# Patient Record
Sex: FEMALE
[De-identification: names, ages, dates, MRNs, and addresses within clinical notes are randomized per-mention and may not be internally consistent; named-entity substitution may affect disease eponyms.]

---

## 2017-03-17 ENCOUNTER — HOSPITAL ENCOUNTER (EMERGENCY)
Dept: HOSPITAL 14 - H.ER | Age: 42
Discharge: HOME | End: 2017-03-17
Payer: SELF-PAY

## 2017-03-17 VITALS
TEMPERATURE: 98.2 F | DIASTOLIC BLOOD PRESSURE: 78 MMHG | SYSTOLIC BLOOD PRESSURE: 117 MMHG | OXYGEN SATURATION: 98 % | HEART RATE: 76 BPM | RESPIRATION RATE: 20 BRPM

## 2017-03-17 VITALS — BODY MASS INDEX: 24.1 KG/M2

## 2017-03-17 DIAGNOSIS — J06.9: Primary | ICD-10-CM

## 2017-03-17 DIAGNOSIS — J02.9: ICD-10-CM

## 2017-03-17 DIAGNOSIS — E78.00: ICD-10-CM

## 2017-03-17 NOTE — ED PDOC
HPI: CCC, URI, Sore Throat


Time Seen by Provider: 17 13:51


Chief Complaint (Nursing): Flu-like Symptoms


Chief Complaint (Provider): Cough


History Per: Patient


History/Exam Limitations: no limitations


Have you had recent travel within the past 21 days to any of the following 

countries: [place], [place], [place] or [place]?: No


Onset/Duration Of Symptoms: Days (since yesterday)


Current Symptoms Are (Timing): Still Present


Associated Symptoms: Sore Throat, Nasal Congestion.  denies: Fever, Chills


Severity: Moderate


Additional Complaint(s): 


Kasia Salazar is a 41 year old female, with a past medical history that includes 

hypercholesterolemia, who presents to the ED on 17 for the evaluation of 

a moderate, nonproductive cough that she has experienced since yesterday. 

Associated nasal congestion and sore throat also reported, though she denies 

fever, chills, shortness of breath, throat swelling, rash, abdominal pain or 

recent travel. Has medicated with theraflu with a reported worsening of 

symptoms.





Of note, recent sick contacts include the patient's 5 month old granddaughter, 

who was recently diagnosed with Influenza.





PMD: Mary Dimas





Past Medical History


Reviewed: Historical Data, Nursing Documentation, Vital Signs


Vital Signs: 


 Last Vital Signs











Temp  98.2 F   17 13:45


 


Pulse  76   17 13:45


 


Resp  20   17 13:45


 


BP  117/78   17 13:45


 


Pulse Ox  98   17 14:30














- Medical History


PMH: Asthma, Fractures (right foot), Hypercholesterolemia, Malignancy (sarcoma 

rt femur), Migraine


   Denies: HIV, Chronic Kidney Disease





- Surgical History


Surgical History: Appendectomy, 





- Family History


Family History: States: Unknown Family Hx, CAD





- Immunization History


Hx Tetanus Toxoid Vaccination: Yes


Hx Influenza Vaccination: Yes


Hx Pneumococcal Vaccination: No





- Home Medications


Home Medications: 


 Ambulatory Orders











 Medication  Instructions  Recorded


 


Albuterol HFA [Ventolin HFA 90 2 puff IH [id number] 16





mcg/actuation (8 g)]  


 


Fluticasone/Salmeterol 250/50 1 puff IH Q12 16





[Advair Diskus]  


 


Montelukast [Singulair] 10 mg PO HS 16


 


Pantoprazole [Protonix] 40 mg PO DAILY 16


 


Sumatriptan Succinate [Imitrex] 100 mg PO PRN PRN 16


 


Ondansetron ODT [Zofran ODT] 4 mg PO Q8 PRN #10 odt 07/15/16


 


Naproxen [Naprosyn] 500 mg PO Q12H #20 tab 16


 


Cyclobenzaprine [Flexeril] 5 mg PO Q8 PRN #15 tab 17


 


Naproxen [Naprosyn] 1 tab PO BID PRN #30 tab 17


 


Benzonatate [Tessalon Perle] 100 mg PO Q8 PRN #30 capsule 17


 


Fluticasone Propionate [Flonase] 2 spr NS DAILY PRN #1 bottle 17














- Allergies


Allergies/Adverse Reactions: 


 Allergies











Allergy/AdvReac Type Severity Reaction Status Date / Time


 


FISH Allergy Intermediate RASH Verified 17 13:44


 


sulfamethoxazole Allergy  RASH Verified 17 13:44





[From Bactrim]     


 


trimethoprim [From Bactrim] Allergy  RASH Verified 17 13:44


 


vancomycin Allergy  RASH Verified 17 13:44














Review of Systems


ENT: Positive for: Nose Congestion, Throat Pain.  Negative for: Throat Swelling


Respiratory: Positive for: Cough.  Negative for: Shortness of Breath


Gastrointestinal: Negative for: Abdominal Pain


Skin: Negative for: Rash





Physical Exam





- Reviewed


Nursing Documentation Reviewed: Yes


Vital Signs Reviewed: Yes





- Physical Exam


Appears: Positive for: Non-toxic, No Acute Distress


Head Exam: Positive for: ATRAUMATIC, NORMOCEPHALIC


Skin: Positive for: Normal Color, Warm, Dry


Eye Exam: Positive for: Normal appearance, PERRL


ENT: Positive for: Normal ENT Inspection.  Negative for: Pharyngeal Erythema, 

Tonsillar Exudate, Tonsillar Swelling


Cardiovascular/Chest: Positive for: Regular Rate, Rhythm.  Negative for: Murmur


Respiratory: Positive for: Normal Breath Sounds.  Negative for: Respiratory 

Distress


Neurologic/Psych: Positive for: Alert, Oriented





- ECG


O2 Sat by Pulse Oximetry: 98 (RA)


Pulse Ox Interpretation: Normal





- Progress


ED Course And Treament: 


Rapid strep, rapid flu: negative.





Medical Decision Making


Medical Decision Makin:51


Initial Impression: cough/congestion/sore throat; will r/o Influenza, Strep





Initial Plan:


* Influenza A B


* Rapid Strep





--------------------------------------------------------------------------------

----------------- 


Scribe Attestation:


Documented by Kerry Lucas, acting as a scribe for Gurinder Laguna PA-C.





Provider Scribe Attestation:


All medical record entries made by the Scribe were at my direction and 

personally dictated by me. I have reviewed the chart and agree that the record 

accurately reflects my personal performance of the history, physical exam, 

medical decision making, and the department course for this patient. I have 

also personally directed, reviewed, and agree with the discharge instructions 

and disposition.





Disposition





- Clinical Impression


Clinical Impression: 


 Upper respiratory infection





- Patient ED Disposition


Is Patient to be Admitted: No





- Disposition


Referrals: 


[facility] [Outside]


Disposition: Routine/Home


Disposition Time: 15:10


Condition: STABLE


Additional Instructions: 


Follow up with your PMD in 2 days for further evaluation. 


Prescriptions: 


Fluticasone Propionate [Flonase] 2 spr NS DAILY PRN #1 bottle


 PRN Reason: Allergy Symptoms


Benzonatate [Tessalon Perle] 100 mg PO Q8 PRN #30 capsule


 PRN Reason: Cough


Instructions:  Upper Respiratory Infection (ED)


Forms:  [facility] ED School/Work Excuse

## 2017-03-21 ENCOUNTER — HOSPITAL ENCOUNTER (EMERGENCY)
Dept: HOSPITAL 42 - ED | Age: 42
Discharge: HOME | End: 2017-03-21
Payer: COMMERCIAL

## 2017-03-21 VITALS — OXYGEN SATURATION: 96 % | HEART RATE: 85 BPM

## 2017-03-21 VITALS — TEMPERATURE: 98.7 F

## 2017-03-21 VITALS — DIASTOLIC BLOOD PRESSURE: 67 MMHG | SYSTOLIC BLOOD PRESSURE: 113 MMHG

## 2017-03-21 VITALS — RESPIRATION RATE: 16 BRPM

## 2017-03-21 VITALS — BODY MASS INDEX: 23.3 KG/M2

## 2017-03-21 DIAGNOSIS — J40: Primary | ICD-10-CM

## 2017-03-21 DIAGNOSIS — M79.1: ICD-10-CM

## 2017-03-21 LAB
ALBUMIN/GLOB SERPL: 1.1 {RATIO} (ref 1.1–1.8)
ALP SERPL-CCNC: 62 U/L (ref 38–133)
ALT SERPL-CCNC: 76 U/L (ref 7–56)
AST SERPL-CCNC: 57 U/L (ref 15–39)
BILIRUB SERPL-MCNC: 0.7 MG/DL (ref 0.2–1.3)
BUN SERPL-MCNC: 10 MG/DL (ref 7–21)
CALCIUM SERPL-MCNC: 9.1 MG/DL (ref 8.4–10.5)
CHLORIDE SERPL-SCNC: 100 MMOL/L (ref 98–107)
CO2 SERPL-SCNC: 27 MMOL/L (ref 21–33)
ERYTHROCYTE [DISTWIDTH] IN BLOOD BY AUTOMATED COUNT: 12.8 % (ref 11.5–14.5)
GLOBULIN SER-MCNC: 3.9 GM/DL
GLUCOSE SERPL-MCNC: 116 MG/DL (ref 70–110)
HCT VFR BLD CALC: 36.5 % (ref 36–48)
MCH RBC QN AUTO: 29.6 PG (ref 25–35)
MCHC RBC AUTO-ENTMCNC: 34.2 G/DL (ref 31–37)
MCV RBC AUTO: 86.3 FL (ref 80–105)
PLATELET # BLD: 198 10^3/UL (ref 120–450)
PMV BLD AUTO: 10.5 FL (ref 7–11)
POTASSIUM SERPL-SCNC: 3.9 MMOL/L (ref 3.6–5)
PROT SERPL-MCNC: 8 G/DL (ref 5.8–8.3)
SODIUM SERPL-SCNC: 136 MMOL/L (ref 132–148)
TROPONIN I SERPL-MCNC: < 0.01 NG/ML
WBC # BLD AUTO: 8 10^3/UL (ref 4.5–11)

## 2017-03-21 PROCEDURE — 84484 ASSAY OF TROPONIN QUANT: CPT

## 2017-03-21 PROCEDURE — 96361 HYDRATE IV INFUSION ADD-ON: CPT

## 2017-03-21 PROCEDURE — 96374 THER/PROPH/DIAG INJ IV PUSH: CPT

## 2017-03-21 PROCEDURE — 82550 ASSAY OF CK (CPK): CPT

## 2017-03-21 PROCEDURE — 96375 TX/PRO/DX INJ NEW DRUG ADDON: CPT

## 2017-03-21 PROCEDURE — 83615 LACTATE (LD) (LDH) ENZYME: CPT

## 2017-03-21 PROCEDURE — 71010: CPT

## 2017-03-21 PROCEDURE — 99284 EMERGENCY DEPT VISIT MOD MDM: CPT

## 2017-03-21 PROCEDURE — 93005 ELECTROCARDIOGRAM TRACING: CPT

## 2017-03-21 PROCEDURE — 80053 COMPREHEN METABOLIC PANEL: CPT

## 2017-03-21 PROCEDURE — 85027 COMPLETE CBC AUTOMATED: CPT

## 2017-03-21 NOTE — ED PDOC
Arrival/HPI





- General


Chief Complaint: Medical Clearance


Time Seen by Provider: 03/21/17 03:06


Historian: Patient





- History of Present Illness


Narrative History of Present Illness (Text): 


03/21/17 03:36


Christiana Jarrell is a 41 year old female, with a history of asthma, GERD, 

appendectomy, migraine, and hyperlipidemia, presents to the emergency 

department complaining of muscle aches and stiffness. Patient reports of mild 

chest discomfort while coughing and occassional resulted vomiting. States she 

has been taking Motrin for pain relief. Patient is currently treated for flu 

with Tamiflu, but states that symptoms have not improved. Denies fever, chills, 

headache, dizziness, diarrhea, urinary symptoms or any other complaints at this 

time.





Time/Duration: < week


Symptom Onset: Gradual


Symptom Course: Unchanged


Severity Level: Mild


Activities at Onset: Light





Past Medical History





- Provider Review


Nursing Documentation Reviewed: Yes





- Cardiac


Hx Cardiac Disorders: No





- Pulmonary


Hx Asthma: Yes





- Neurological


Hx Migraine: Yes





- HEENT


Hx HEENT Disorder: No





- Renal


Hx Renal Disorder: No





- Endocrine/Metabolic


Hx Endocrine Disorders: No





- Hematological/Oncological


Hx Blood Disorders: No





- Integumentary


Hx Dermatological Disorder: No





- Musculoskeletal/Rheumatological


Hx Fractures: Yes


Other/Comment: fx right femur/plates/screws





- Gastrointestinal


Hx Gastrointestinal Disorders: Yes


Hx Gastroesophageal Reflux: Yes





- Genitourinary/Gynecological


Hx Genitourinary Disorders: No





- Psychiatric


Hx Psychophysiologic Disorder: No


Hx Substance Use: No





- Surgical History


Hx Appendectomy: Yes


Hx Orthopedic Surgery: Yes (/right femur  plates/screws)





- Anesthesia


Hx Anesthesia: Yes


Hx Anesthesia Reactions: Yes (severe vomitting)


Hx Malignant Hyperthermia: No





- Suicidal Assessment


Feels Threatened In Home Enviroment: No





Family/Social History





- Physician Review


Nursing Documentation Reviewed: Yes


Family/Social History: No Known Family HX


Smoking Status: Never Smoked


Hx Alcohol Use: No


Hx Substance Use: No





Allergies/Home Meds


Allergies/Adverse Reactions: 


Allergies





FISH Allergy (Intermediate, Verified 03/21/17 02:50)


 RASH


 Patient reports she will break out if she eats fish 


sulfamethoxazole [From Bactrim] Allergy (Verified 03/21/17 02:50)


 RASH


trimethoprim [From Bactrim] Allergy (Verified 03/21/17 02:50)


 RASH


vancomycin Allergy (Verified 03/21/17 02:50)


 RASH








Home Medications: 


 Home Meds











 Medication  Instructions  Recorded  Confirmed


 


Albuterol HFA [Ventolin HFA 90 2 puff IH [id number] 05/05/16 05/05/16





mcg/actuation (8 g)]   


 


Fluticasone/Salmeterol 250/50 1 puff IH Q12 05/05/16 05/05/16





[Advair Diskus]   


 


Montelukast [Singulair] 10 mg PO HS 05/05/16 05/05/16


 


Pantoprazole [Protonix] 40 mg PO DAILY 05/05/16 05/05/16


 


Sumatriptan Succinate [Imitrex] 100 mg PO PRN PRN 05/05/16 05/05/16














Review of Systems





- Physician Review


All systems were reviewed & negative as marked: Yes





- Review of Systems


Constitutional: Fatigue.  absent: Fevers


Respiratory: Cough.  absent: SOB, Sputum


Cardiovascular: Chest Pain (while coughing )


Musculoskeletal: Myalgias


Neurological: Normal.  absent: Headache, Dizziness


Psychiatric: Normal





Physical Exam


Vital Signs Reviewed: Yes


Vital Signs











  Temp Pulse Resp BP Pulse Ox


 


 03/21/17 07:00     113/67 


 


 03/21/17 06:46   85  16  102/55 L  96


 


 03/21/17 05:50   87  16  108/68  97


 


 03/21/17 04:05   91 H  16  114/71  98


 


 03/21/17 02:53  98.7 F    











Temperature: Afebrile


Blood Pressure: Normal


Pulse: Regular


Respiratory Rate: Normal


Appearance: Positive for: Well-Appearing, Non-Toxic, Comfortable


Pain Distress: None


Mental Status: Positive for: Alert and Oriented X 3





- Systems Exam


Head: Present: Atraumatic, Normocephalic


Pupils: Present: PERRL


Extroacular Muscles: Present: EOMI


Conjunctiva: Present: Normal


Respiratory/Chest: Present: Clear to Auscultation, Good Air Exchange.  No: 

Respiratory Distress, Accessory Muscle Use


Cardiovascular: Present: Regular Rate and Rhythm, Normal S1, S2.  No: Murmurs


Abdomen: Present: Normal Bowel Sounds.  No: Tenderness, Distention, Peritoneal 

Signs, Rebound, Guarding


Neurological: Present: GCS=15, CN II-XII Intact, Speech Normal, Motor Func 

Grossly Intact, Normal Sensory Function


Skin: Present: Warm, Dry, Normal Color.  No: Rashes


Psychiatric: Present: Alert, Oriented x 3, Normal Insight, Normal Concentration





Medical Decision Making


ED Course and Treatment: 


03/21/17 03:45


Impression: A 41 year old female presenting to emergency department for muscle 

aches associated with cough and occassional episode vomiting for past few days. 








Plan:


-- EKG


-- labs, cardiac enzymes


-- Chest X-ray


-- Flexeril


-- IV fluids


-- Toradol


-- Zofran 


-- Reassess and disposition





03/21/17 06:48


Chest X-ray interpreted by me:


no acute disease.





On re-evaluation, patient feels better and is in no acute distress. I have 

discussed the results and plan with the patient, who expresses understanding. 

Patient in agreement with plan to be discharged home. Patient is stable for 

discharge. Patient was instructed to follow up with physician or return if 

symptoms worsen or new concerning symptoms arise.











- Lab Interpretations


Lab Results: 








 03/21/17 04:00 





 03/21/17 04:00 





 Lab Results





03/21/17 04:00: WBC 8.0, RBC 4.23, Hgb 12.5, Hct 36.5, MCV 86.3, MCH 29.6, MCHC 

34.2, RDW 12.8, Plt Count 198, MPV 10.5, Sodium 136, Potassium 3.9, Chloride 100

, Carbon Dioxide 27, Anion Gap 13, BUN 10, Creatinine 0.7, Est GFR (African Amer

) > 60, Est GFR (Non-Af Amer) > 60, Random Glucose 116 H, Calcium 9.1, Total 

Bilirubin 0.7, AST 57 H, ALT 76 H, Alkaline Phosphatase 62, Lactate 

Dehydrogenase 471, Total Creatine Kinase 49, Troponin I < 0.01, Total Protein 

8.0, Albumin 4.1, Globulin 3.9, Albumin/Globulin Ratio 1.1








I have reviewed the lab results: Yes





- RAD Interpretation


Radiology Orders: 








03/21/17 03:32


CHEST PORTABLE [RAD] Stat 











: ED Physician





- Medication Orders


Current Medication Orders: 











Discontinued Medications





Cyclobenzaprine HCl (Flexeril)  10 mg PO ONCE ONE


   Stop: 03/21/17 03:34


   Last Admin: 03/21/17 03:55  Dose: 10 MG





Guaifenesin/Codeine Phosphate (Robitussin W/Codeine)  5 ml PO ONCE ONE


   Stop: 03/21/17 04:33


   Last Admin: 03/21/17 04:45  Dose: 5 ML





Sodium Chloride (Sodium Chloride 0.9%)  1,000 mls @ 999 mls/hr IV .Q1H1M STA


   Stop: 03/21/17 04:34


   Last Admin: 03/21/17 04:03  Dose: 999 MLS/HR





eMAR Start Stop


 Document     03/21/17 04:03  YP  (Rec: 03/21/17 04:03  [id number])


     Intravenous Solution


      Start Date                                 03/21/17


      Start Time                                 04:03


      End Date                                   03/21/17


      End time                                   05:03


      Total Infusion Time                        60





Ketorolac Tromethamine (Toradol)  30 mg IVP ONCE ONE


   Stop: 03/21/17 03:34


   Last Admin: 03/21/17 04:03  Dose: 30 MG





IVP Administration


 Document     03/21/17 04:03  YP  (Rec: 03/21/17 04:04  YP  [id number])


     Charges for Administration


      # of IVP Administrations                   1





Ondansetron HCl (Zofran Inj)  4 mg IVP ONCE ONE


   Stop: 03/21/17 03:35


   Last Admin: 03/21/17 04:01  Dose: 4 MG





IVP Administration


 Document     03/21/17 04:01  YP  (Rec: 03/21/17 04:04  YP  [id number])


     Charges for Administration


      # of IVP Administrations                   1














- Scribe Statement


The provider has reviewed the documentation as recorded by the Marylin Noble 


Provider Attestation: 





All medical record entries made by the Marylin were at my direction and 

personally dictated by me. I have reviewed the chart and agree that the record 

accurately reflects my personal performance of the history, physical exam, 

medical decision making, and the department course for this patient. I have 

also personally directed, reviewed, and agree with the discharge instructions 

and disposition.





Disposition/Present on Arrival





- Present on Arrival


Any Indicators Present on Arrival: No


History of DVT/PE: No


History of Uncontrolled Diabetes: No


Urinary Catheter: No


History of Decub. Ulcer: No


History Surgical Site Infection Following: None





- Disposition


Have Diagnosis and Disposition been Completed?: Yes


Diagnosis: 


 Bronchitis, Myalgia


Disposition: HOME/ ROUTINE


Disposition Time: 06:41


Patient Plan: Discharge


Patient Problems: 


 Current Active Problems











Problem Status Diagnosed


 


Bronchitis Acute 


 


Myalgia Acute 











Condition: GOOD


Discharge Instructions (ExitCare):  Acute Bronchitis (ED), Musculoskeletal Pain 

(ED)


Additional Instructions: 


Take meds as prescribed/drink plenty of liquids/follow up with your doctor this 

week


Prescriptions: 


Cyclobenzaprine [Cyclobenzaprine HCl] 10 mg PO TID PRN #15 tab


 PRN Reason: Muscle Spasm


guaiFENesin/Dextromethorphan [guaiFENesin-DM] 5 ml PO Q6 PRN #6 oz


 PRN Reason: Cough


Albuterol HFA [Ventolin HFA 90 mcg/actuation (8 g)] 2 puff IH [id number] PRN #1 

puff


 PRN Reason: Wheezing


Azithromycin [Zithromax] 250 mg PO DAILY #6 tab


Forms:  WORK NOTE

## 2017-03-21 NOTE — RAD
HISTORY:

cough  



COMPARISON:

No prior. 



FINDINGS:



LUNGS:

Suboptimal study due to rotation. No evidence of focal infiltrate or 

consolidation in the lungs.



PLEURA:

No significant pleural effusion identified, no pneumothorax apparent.



CARDIOVASCULAR:

Normal.



OSSEOUS STRUCTURES:

No significant abnormalities.



VISUALIZED UPPER ABDOMEN:

Normal.



OTHER FINDINGS:

None.



IMPRESSION:

No radiographic evidence of pneumonia.

## 2017-03-21 NOTE — CARD
--------------- APPROVED REPORT --------------





EKG Measurement

Heart [id number]

SD 116P4

[id number]

[id number]

[id number]



<Conclusion>

Normal sinus rhythm

Normal ECG

## 2017-04-30 ENCOUNTER — HOSPITAL ENCOUNTER (EMERGENCY)
Dept: HOSPITAL 31 - C.ER | Age: 42
Discharge: HOME | End: 2017-04-30
Payer: MEDICAID

## 2017-04-30 VITALS
OXYGEN SATURATION: 99 % | HEART RATE: 70 BPM | DIASTOLIC BLOOD PRESSURE: 64 MMHG | TEMPERATURE: 98.3 F | RESPIRATION RATE: 18 BRPM | SYSTOLIC BLOOD PRESSURE: 94 MMHG

## 2017-04-30 VITALS — BODY MASS INDEX: 23.3 KG/M2

## 2017-04-30 DIAGNOSIS — M79.671: Primary | ICD-10-CM

## 2017-04-30 NOTE — C.PDOC
History Of Present Illness


41 year old patient presents to the ED complaining of right foot heel since 

yesterday. Patient states she has a burning and pulling sensation. Patient 

notes she took Motrin prior to arrival. Patient denies any new trauma, fever, 

numbness, weakness, or any more pain medications. Patient has a history of 

plantar fasciitis, surgical removal of benign tumors, and fractures to the left 

foot.


Time Seen by Provider: 17 13:38


Chief Complaint (Nursing): Lower Extremity Problem/Injury


History Per: Patient


History/Exam Limitations: no limitations


Onset/Duration Of Symptoms: Days (1)


Current Symptoms Are (Timing): Still Present


Severity: Mild


Pain Scale Rating Of: 3


Recent travel outside of the [place]: No





Past Medical History


Reviewed: Historical Data, Nursing Documentation, Vital Signs


Vital Signs: 


 Last Vital Signs











Temp  98.3 F   17 13:33


 


Pulse  70   17 13:33


 


Resp  18   17 13:33


 


BP  94/64 L  17 13:33


 


Pulse Ox  99   17 15:24














- Medical History


PMH: Asthma, Fractures, Hypercholesterolemia, Malignancy (sarcoma rt femur), 

Migraine


Surgical History: Appendectomy, 





- [facility] Procedures








APPLICATION OF SPLINT (14)


EXCISION OF ESOPHAGUS, ENDO, DIAGN (11/27/15)


EXCISION OF STOMACH, ENDO, DIAGN (11/27/15)


IMMOBILIZ/WOUND ATTN NEC (03/08/15)


INJECT/INFUSE NEC (01/19/15)


PHYSICAL THERAPY NEC (01/08/15)








Family History: States: CAD





- Social History


Hx Tobacco Use: No


Hx Alcohol Use: No


Hx Substance Use: No





- Immunization History


Hx Tetanus Toxoid Vaccination: Yes


Hx Influenza Vaccination: Yes


Hx Pneumococcal Vaccination: No





Review Of Systems


Except As Marked, All Systems Reviewed And Found Negative.


Constitutional: Negative for: Fever


Musculoskeletal: Positive for: Foot Pain (right heel)


Neurological: Negative for: Weakness, Numbness





Physical Exam





- Physical Exam


Appears: Non-toxic, No Acute Distress


Skin: Warm, Dry


Head: Atraumatic, Normacephalic


Eye(s): bilateral: Normal Inspection, EOMI


Oral Mucosa: Moist


Neck: Normal ROM, Supple


Chest: Symmetrical


Respiratory: No Accessory Muscle Use


Back: Normal Inspection


Extremity: Normal ROM, No Pedal Edema, No Calf Tenderness, Capillary Refill (<2 

seconds), No Deformity, No Swelling, Other (healed scar to the medial aspect of 

the right ankle; tenderness to the right heel; sensation intact; full ROM to 

the right foot)


Pulses: Left Dorsalis Pedis: Normal, Right Dorsalis Pedis: Normal


Neurological/Psych: Oriented x3, Normal Speech, Normal Cognition, Normal Motor, 

Normal Sensation


Gait: Steady





ED Course And Treatment


O2 Sat by Pulse Oximetry: 99 (RA)


Pulse Ox Interpretation: Normal





- Other Rad


  ** right ankle


X-Ray: Read By Radiologist (DR. Tao, Neda PATINO MD)


Interpretation: PROCEDURE:  Right Ankle Radiographs.  HISTORY:  pain.  

COMPARISON:  Right ankle radiographs performed 9/2/15.  FINDINGS:  BONES:  No 

acute displaced fracture. Small heel spur.  JOINTS:  No dislocation.  SOFT 

TISSUES:  Unremarkable. No evidence of radiopaque foreign body.  OTHER FINDINGS

:  None.  IMPRESSION:  No acute displaced fracture, dislocation, or significant 

joint effusion identified.  If symptoms persist or if there is clinical concern

, x-ray follow-up in 7-10 days should be considered.


Progress Note: Plan:  -Right ankle x-ray.  -Reassess and disposition.  On 

reassessment, patient is resting comfortably, and is in no acute distress. 

Patient was instructed to follow up with podiatrist in 1-2 days for further 

evaluation. Air cast applied by ed tech.





Disposition





- Disposition


Referrals: 


[facility] [Outside]


Christina Glass MD [Staff Provider] - 


Disposition: HOME/ ROUTINE


Disposition Time: 14:26


Condition: STABLE


Additional Instructions: 


Follow up with the clinic in 2-5 days for further evaluation. Take medications 

as prescribed. Return to the emergency department at any time if symptoms 

persist or worsen. You may call [facility] for any assistance [phone].


Instructions:  Foot Sprain (ED)





- Clinical Impression


Clinical Impression: 


 Foot pain








- PA / NP / Resident Statement


MD/DO has reviewed & agrees with the documentation as recorded.





- Scribe Statement


The provider has reviewed the documentation as recorded by the Scribe


Priya Nolen





All medical record entries made by the Scribe were at my direction and 

personally dictated by me. I have reviewed the chart and agree that the record 

accurately reflects my personal performance of the history, physical exam, 

medical decision making, and the department course for this patient. I have 

also personally directed, reviewed, and agree with the discharge instructions 

and disposition.

## 2017-04-30 NOTE — RAD
PROCEDURE:  Right Ankle Radiographs.



HISTORY:

pain  



COMPARISON:

Right ankle radiographs performed 9/2/15



FINDINGS:



BONES:

No acute displaced fracture. Small heel spur. 



JOINTS:

No dislocation. 



SOFT TISSUES:

Unremarkable. No evidence of radiopaque foreign body. 



OTHER FINDINGS:

None.



IMPRESSION:

No acute displaced fracture, dislocation, or significant joint 

effusion identified.



If symptoms persist or if there is clinical concern, x-ray follow-up 

in 7-10 days should be considered.

## 2017-07-12 ENCOUNTER — HOSPITAL ENCOUNTER (EMERGENCY)
Dept: HOSPITAL 42 - ED | Age: 42
Discharge: LEFT BEFORE DISCHARGE | End: 2017-07-12
Payer: COMMERCIAL

## 2017-07-12 VITALS
DIASTOLIC BLOOD PRESSURE: 70 MMHG | SYSTOLIC BLOOD PRESSURE: 103 MMHG | OXYGEN SATURATION: 99 % | RESPIRATION RATE: 19 BRPM | TEMPERATURE: 98.1 F | HEART RATE: 74 BPM

## 2017-07-12 VITALS — BODY MASS INDEX: 23.3 KG/M2

## 2017-07-12 DIAGNOSIS — M79.604: Primary | ICD-10-CM

## 2017-07-12 NOTE — ED PDOC
Arrival/HPI





- General


Historian: Patient





- History of Present Illness


Time/Duration: 4-6 hours


Symptom Course: Intermittent


Severity Level: Moderate


Activities at Onset: Other (walking up flight of stairs)





- General


Chief Complaint: Lower Extremity Problem/Injury


Time Seen by Provider: 07/12/17 16:04





- History of Present Illness


Narrative History of Present Illness (Text): 





07/12/17 17:40





Christiana Jarrell is a 42 year old female with pmh sig for plantar fascitis, 

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 

foot, and plantar fascititis complaining of right leg pain. Pt states that 

today she was walking up a flight of stairs when she injured her leg. She does 

not point to any specific reason for injuring her leg. She does report feeling 

both a popping and tearing sensation in her right leg. The patient stated that 

after the inital insult she began to feel a burning sensation down her leg. 

Shortly after her injury she began to feel an increased burning sensation on 

her posterior leg and into her heel and bottom of foot as well as feeling a 

different sensation of pins and needles on her anterior foot. She states she 

has never felt these symptoms prior to today and that they are different from 

her previous issues with neuropathy and pain in her right leg. She has tried 

tyelnol and motrin with no help. She states the pain is worse with bearing 

weight. She describes the pain sensation as intermittent through out the day 

with no particular pattern. 


 (ISABEL CURRAN)





Past Medical History





- Provider Review


Nursing Documentation Reviewed: Yes





- Infectious Disease


Hx of Infectious Diseases: None





- Cardiac


Hx Cardiac Disorders: Yes





- Pulmonary


Hx Asthma: Yes





- Neurological


Hx Migraine: Yes





- HEENT


Hx HEENT Disorder: No





- Renal


Hx Renal Disorder: No





- Endocrine/Metabolic


Hx Endocrine Disorders: No





- Hematological/Oncological


Hx Blood Disorders: No





- Integumentary


Hx Dermatological Disorder: No





- Musculoskeletal/Rheumatological


Hx Fractures: Yes





- Gastrointestinal


Hx Gastrointestinal Disorders: Yes


Hx Gastroesophageal Reflux: Yes





- Genitourinary/Gynecological


Hx Genitourinary Disorders: No





- Psychiatric


Hx Psychophysiologic Disorder: No


Hx Substance Use: No





- Surgical History


Hx Appendectomy: Yes


Hx Musculoskeletal Surgery: Yes (L femur, screw and plate placement)


Other/Comment: ovarian ablasion





- Anesthesia


Hx Anesthesia: Yes


Hx Anesthesia Reactions: Yes (severe vomitting)


Hx Malignant Hyperthermia: No





- Suicidal Assessment


Feels Threatened In Home Enviroment: No





Family/Social History





- Physician Review


Nursing Documentation Reviewed: Yes


Family/Social History: No Known Family HX


Smoking Status: Never Smoked


Hx Alcohol Use: No


Hx Substance Use: No





Allergies/Home Meds


Allergies/Adverse Reactions: 


Allergies





FISH Allergy (Intermediate, Verified 07/12/17 15:56)


 RASH


 Patient reports she will break out if she eats fish 


sulfamethoxazole [From Bactrim] Allergy (Verified 07/12/17 15:56)


 RASH


trimethoprim [From Bactrim] Allergy (Verified 07/12/17 15:56)


 RASH


vancomycin Allergy (Verified 07/12/17 15:56)


 RASH








Home Medications: 


 Home Meds











 Medication  Instructions  Recorded  Confirmed


 


No Known Home Med  04/30/17 07/12/17














Review of Systems





- Review of Systems


Constitutional: absent: Fatigue, Weight Change


Eyes: absent: Vision Changes, Photophobia


ENT: absent: Hearing Changes, Tinnitus


Respiratory: absent: SOB, Cough


Cardiovascular: Normal.  absent: Chest Pain, Palpitations


Gastrointestinal: absent: Abdominal Pain, Constipation, Diarrhea


Genitourinary Female: absent: Dysuria, Frequency


Musculoskeletal: Other (left lower extremity pain associated with left leg 

numbness and burning sensation )


Skin: absent: Rash, Pruritis


Neurological: Other.  absent: Gait Changes





Physical Exam


Vital Signs Reviewed: Yes


Temperature: Afebrile


Blood Pressure: Normal


Pulse: Regular


Respiratory Rate: Normal


Appearance: Positive for: Well-Appearing


Pain Distress: Mild


Mental Status: Positive for: Alert and Oriented X 3





- Systems Exam


Head: Present: Atraumatic, Normocephalic


Pupils: Present: PERRL


Extroacular Muscles: Present: EOMI


Conjunctiva: Present: Normal


Mouth: Present: Moist Mucous Membranes


Neck: Present: Normal Range of Motion


Respiratory/Chest: Present: Clear to Auscultation, Good Air Exchange.  No: 

Respiratory Distress, Accessory Muscle Use


Cardiovascular: Present: Regular Rate and Rhythm, Normal S1, S2


Abdomen: Present: Normal Bowel Sounds.  No: Tenderness, Distention


Upper Extremity: Present: Normal Inspection, NORMAL PULSES.  No: Cyanosis


Lower Extremity: Present: NORMAL PULSES, Tenderness (Left dorsal and plantar 

aspect of foot), Neurovascularly Intact (bilaterally), Capillary Refill < 2 s.  

No: Normal ROM (limited ROM of left ankle 2/2 discomfort), Swelling, Erythema, 

Deformity


Neurological: Present: GCS=15, CN II-XII Intact, Speech Normal


Skin: Present: Warm, Dry


Psychiatric: Present: Alert, Oriented x 3





Medical Decision Making


ED Course and Treatment: 





07/12/17 18:11


Impression:





Christiana Jarrell is a 42 year old female with a past medical history significant 

for chronic pain and nerve issues to her right leg who complaining of new onset 

left leg pain for the past 4-6 hours. 





Differential Diagnosis included but are not limited to:  





- Acute on chronic left leg pain


- Neuropathic pain associated with chronic left leg pain secondary to previous 

surgeries involving removal of benign tumors


- Plantar fascitis 





Plan:





- Discussed with patient need for varying modalities of diagnostic imaging and 

testing to further evaluate nerve pain/discomfort of lower extremities and 

patient was agreeable and understood


-- Reassess and disposition








Progress Notes:


 (ISABEL CURRAN)





- PA / NP / Resident Statement


MD/DO has reviewed & agrees with the documentation as recorded.


MD/DO has examined the patient and agrees with the treatment plan.





Disposition/Present on Arrival





- Present on Arrival


Any Indicators Present on Arrival: No


History of DVT/PE: No


History of Uncontrolled Diabetes: No


Urinary Catheter: No


History of Decub. Ulcer: No


History Surgical Site Infection Following: None





- Disposition


Have Diagnosis and Disposition been Completed?: Yes


Disposition Time: 17:45





- Disposition


Diagnosis: 


 Leg pain





Disposition: ELOPEMENT - ER ONLY


Condition: GOOD


Discharge Instructions (ExitCare):  Leg Pain (ED)


Additional Instructions: 





Thank you for letting us take care of you today. The emergency medical care you 

received today was directed at your acute symptoms. If you were prescribed any 

medication, please fill it and take as directed. It may take several days for 

your symptoms to resolve. Return to the Emergency Department if your symptoms 

worsen, do not improve, or if you have any other problems.





Please contact your doctor or call one of the physicians/clinics you have been 

referred to that are listed on the Patient Visit Information form that is 

included in your discharge packet. Bring any paperwork you were given at 

discharge with you along with any medications you are taking to your follow up 

visit. Our treatment cannot replace ongoing medical care by a primary care 

provider (PCP) outside of the emergency department.





Thank you for allowing the [facility] team to be part of your care today.














Please follow up with your primary doctor in 2-3 days.  You may need an MRI or 

other testing as an outpatient.


Referrals: 


PCP,NO [Primary Care Provider] - Follow up with primary

## 2017-07-22 ENCOUNTER — HOSPITAL ENCOUNTER (EMERGENCY)
Dept: HOSPITAL 14 - H.ER | Age: 42
Discharge: HOME | End: 2017-07-22
Payer: MEDICAID

## 2017-07-22 VITALS
DIASTOLIC BLOOD PRESSURE: 71 MMHG | TEMPERATURE: 98.6 F | OXYGEN SATURATION: 98 % | RESPIRATION RATE: 18 BRPM | HEART RATE: 78 BPM | SYSTOLIC BLOOD PRESSURE: 113 MMHG

## 2017-07-22 VITALS — BODY MASS INDEX: 23.3 KG/M2
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
## 2017-07-22 DIAGNOSIS — S89.91XA: Primary | ICD-10-CM

## 2017-07-22 DIAGNOSIS — S99.911A: ICD-10-CM

## 2017-07-22 DIAGNOSIS — E78.00: ICD-10-CM

## 2017-07-22 NOTE — ED PDOC
Lower Extremity Pain/Injury


Time Seen by Provider: 17 09:47


Chief Complaint (Nursing): Lower Extremity Problem/Injury


Chief Complaint (Provider): Knee injury


History Per: Patient


History/Exam Limitations: no limitations


Onset/Duration Of Symptoms: Days (Yesterday)


Current Symptoms Are (Timing): Still Present


Additional Complaint(s): 





Pt. with R knee and ankle pain.  Started last night after twisting her ankle 

and knee while walking.  No numbness, tingles, weakness.  No hip pain.  No back 

pain, chest pain, dyspnea.  No dizziness, head injury.  Ambulating on it with 

pain and wearing slippers.





Past Medical History


Reviewed: Nursing Documentation, Vital Signs


Vital Signs: 





 Last Vital Signs











Temp  98.6 F   17 10:05


 


Pulse  78   17 10:05


 


Resp  18   17 10:05


 


BP  113/71   17 10:05


 


Pulse Ox  98   17 10:05














- Medical History


PMH: Asthma, Fractures, Hypercholesterolemia, Malignancy (sarcoma rt femur), 

Migraine


   Denies: Chronic Kidney Disease





- Surgical History


Surgical History: Appendectomy, 





- Family History


Family History: States: CAD





- Immunization History


Hx Tetanus Toxoid Vaccination: Yes


Hx Influenza Vaccination: Yes


Hx Pneumococcal Vaccination: No





- Home Medications


Home Medications: 


 Ambulatory Orders











 Medication  Instructions  Recorded


 


Ibuprofen [Motrin] 600 mg PO TID 7 Days 17














- Allergies


Allergies/Adverse Reactions: 


 Allergies











Allergy/AdvReac Type Severity Reaction Status Date / Time


 


FISH Allergy Intermediate RASH Verified 17 10:15


 


sulfamethoxazole Allergy  RASH Verified 17 10:15





[From Bactrim]     


 


trimethoprim [From Bactrim] Allergy  RASH Verified 17 10:15


 


vancomycin Allergy  RASH Verified 17 10:15














Review of Systems


Constitutional: Negative for: Weakness


Cardiovascular: Negative for: Chest Pain


Respiratory: Negative for: Shortness of Breath


Musculoskeletal: Positive for: Leg Pain.  Negative for: Neck Pain, Shoulder Pain

, Arm Pain, Back Pain, Hand Pain


Skin: Negative for: Rash


Neurological: Negative for: Weakness, Numbness, Dizziness





Physical Exam





- Reviewed


Nursing Documentation Reviewed: Yes


Vital Signs Reviewed: Yes





- Physical Exam


Appears: Positive for: Non-toxic, No Acute Distress


Head Exam: Positive for: ATRAUMATIC, NORMAL INSPECTION, NORMOCEPHALIC


Skin: Positive for: Normal Color, Warm, DRY


Neck: Positive for: Normal, Painless ROM


Cardiovascular/Chest: Positive for: Regular Rate, Rhythm


Respiratory: Positive for: CNT, Normal Breath Sounds


Pulses-Dorsalis Pedis (R): 2+


Back: Positive for: Normal Inspection.  Negative for: L CVA Tenderness, R CVA 

Tenderness


Extremity: Positive for: Normal ROM, Tenderness (R lateral knee mild with trace 

swelling; no laxity to joint; R ankle lateral mild tender; no laxity or swelling

; no erythema anywhere).  Negative for: Calf Tenderness


Neurologic/Psych: Positive for: Alert, Oriented





- ECG


O2 Sat by Pulse Oximetry: 98


Pulse Ox Interpretation: Normal





- Radiology


X-Ray: Interpreted by Me, Viewed By Me


X-Ray Interpretation: No Acute Disease





- Progress


ED Course And Treament: 


1134:  Stable.  AAOx3.  Ambulating with no issues.  Fu with pcp.





Disposition





- Clinical Impression


Clinical Impression: 


 Knee injury, Ankle injury








- Patient ED Disposition


Is Patient to be Admitted: No


Counseled Patient/Family Regarding: Studies Performed, Diagnosis, Need For 

Followup





- Disposition


Referrals: 


[facility] [Outside] - 17


Disposition: Routine/Home


Disposition Time: 11:37


Condition: STABLE


Additional Instructions: 


Return if not better in 3 days.


Prescriptions: 


Ibuprofen [Motrin] 600 mg PO TID 7 Days


Instructions:  Musculoskeletal Pain (ED), Knee Pain (ED)

## 2017-07-23 NOTE — RAD
HISTORY:

 pain 



COMPARISON:

No prior



FINDINGS:



BONES:

Normal. No fracture.



JOINTS:

Normal. No osteoarthritis.



SOFT TISSUE:

Plantar spur formation.



OTHER FINDINGS:

None .



IMPRESSION:

No fracture.

## 2017-07-23 NOTE — RAD
PROCEDURE:  Right Knee Radiographs.



HISTORY:

pain



COMPARISON:

None.



FINDINGS:



BONES:

Postsurgical changes of the distal femur. No fracture. 



JOINTS:

Normal. No osteoarthritis. 



JOINT EFFUSION:

None. 



OTHER FINDINGS:

None.



IMPRESSION:

Postsurgical changes of the distal femur.

## 2017-09-25 ENCOUNTER — HOSPITAL ENCOUNTER (EMERGENCY)
Dept: HOSPITAL 14 - H.ER | Age: 42
Discharge: HOME | End: 2017-09-25
Payer: COMMERCIAL

## 2017-09-25 VITALS
TEMPERATURE: 98.3 F | OXYGEN SATURATION: 98 % | HEART RATE: 85 BPM | RESPIRATION RATE: 18 BRPM | SYSTOLIC BLOOD PRESSURE: 120 MMHG | DIASTOLIC BLOOD PRESSURE: 76 MMHG

## 2017-09-25 VITALS — BODY MASS INDEX: 23.3 KG/M2

## 2017-09-25 DIAGNOSIS — I88.0: Primary | ICD-10-CM

## 2017-09-25 DIAGNOSIS — K59.00: ICD-10-CM

## 2017-09-25 DIAGNOSIS — K44.9: ICD-10-CM

## 2017-09-25 DIAGNOSIS — M79.604: ICD-10-CM

## 2017-09-25 LAB
ALBUMIN/GLOB SERPL: 1.3 {RATIO} (ref 1–2.1)
ALP SERPL-CCNC: 50 U/L (ref 38–126)
ALT SERPL-CCNC: 23 U/L (ref 9–52)
APTT BLD: 31.6 SECONDS (ref 25.6–37.1)
AST SERPL-CCNC: 18 U/L (ref 14–36)
BASOPHILS # BLD AUTO: 0 K/UL (ref 0–0.2)
BASOPHILS NFR BLD: 0.5 % (ref 0–2)
BILIRUB SERPL-MCNC: 0.5 MG/DL (ref 0.2–1.3)
BILIRUB UR-MCNC: NEGATIVE MG/DL
BUN SERPL-MCNC: 19 MG/DL (ref 7–17)
CALCIUM SERPL-MCNC: 9.3 MG/DL (ref 8.4–10.2)
CHLORIDE SERPL-SCNC: 105 MMOL/L (ref 98–107)
CO2 SERPL-SCNC: 22 MMOL/L (ref 22–30)
COLOR UR: YELLOW
EOSINOPHIL # BLD AUTO: 0.1 K/UL (ref 0–0.7)
EOSINOPHIL NFR BLD: 0.9 % (ref 0–4)
ERYTHROCYTE [DISTWIDTH] IN BLOOD BY AUTOMATED COUNT: 13.6 % (ref 11.5–14.5)
GLOBULIN SER-MCNC: 3.2 GM/DL (ref 2.2–3.9)
GLUCOSE SERPL-MCNC: 92 MG/DL (ref 65–105)
GLUCOSE UR STRIP-MCNC: (no result) MG/DL
HCT VFR BLD CALC: 35.1 % (ref 34–47)
KETONES UR STRIP-MCNC: NEGATIVE MG/DL
LEUKOCYTE ESTERASE UR-ACNC: (no result) LEU/UL
LYMPHOCYTES # BLD AUTO: 1.7 K/UL (ref 1–4.3)
LYMPHOCYTES NFR BLD AUTO: 30.9 % (ref 20–40)
MCH RBC QN AUTO: 29.6 PG (ref 27–31)
MCHC RBC AUTO-ENTMCNC: 33.8 G/DL (ref 33–37)
MCV RBC AUTO: 87.7 FL (ref 81–99)
MONOCYTES # BLD: 0.5 K/UL (ref 0–0.8)
MONOCYTES NFR BLD: 9 % (ref 0–10)
NEUTROPHILS # BLD: 3.3 K/UL (ref 1.8–7)
NEUTROPHILS NFR BLD AUTO: 58.7 % (ref 50–75)
NRBC BLD AUTO-RTO: 0.1 % (ref 0–0)
PH UR STRIP: 5 [PH] (ref 5–8)
PLATELET # BLD: 160 K/UL (ref 130–400)
PMV BLD AUTO: 9.3 FL (ref 7.2–11.7)
POTASSIUM SERPL-SCNC: 4.2 MMOL/L (ref 3.6–5)
PROT SERPL-MCNC: 7.3 G/DL (ref 6.3–8.2)
PROT UR STRIP-MCNC: NEGATIVE MG/DL
RBC # UR STRIP: (no result) /UL
RBC #/AREA URNS HPF: 4 /HPF (ref 0–3)
SODIUM SERPL-SCNC: 136 MMOL/L (ref 132–148)
SP GR UR STRIP: 1.02 (ref 1–1.03)
UROBILINOGEN UR-MCNC: (no result) MG/DL (ref 0.2–1)
WBC # BLD AUTO: 5.6 K/UL (ref 4.8–10.8)
WBC #/AREA URNS HPF: 1 /HPF (ref 0–5)

## 2017-09-25 PROCEDURE — 85025 COMPLETE CBC W/AUTO DIFF WBC: CPT

## 2017-09-25 PROCEDURE — 74176 CT ABD & PELVIS W/O CONTRAST: CPT

## 2017-09-25 PROCEDURE — 99284 EMERGENCY DEPT VISIT MOD MDM: CPT

## 2017-09-25 PROCEDURE — 93971 EXTREMITY STUDY: CPT

## 2017-09-25 PROCEDURE — 80053 COMPREHEN METABOLIC PANEL: CPT

## 2017-09-25 PROCEDURE — 81003 URINALYSIS AUTO W/O SCOPE: CPT

## 2017-09-25 PROCEDURE — 93005 ELECTROCARDIOGRAM TRACING: CPT

## 2017-09-25 PROCEDURE — 85730 THROMBOPLASTIN TIME PARTIAL: CPT

## 2017-09-25 PROCEDURE — 81025 URINE PREGNANCY TEST: CPT

## 2017-09-25 PROCEDURE — 85610 PROTHROMBIN TIME: CPT

## 2017-09-25 NOTE — CT
PROCEDURE:  CT Abdomen and Pelvis without intravenous contrast



HISTORY:

L CVAT



COMPARISON:

None.



TECHNIQUE:

Technique. 



Contrast Dose: 



Radiation dose:



Total exam DLP =  mGy-cm.



This CT exam was performed using one or more of the following dose 

reduction techniques: Automated exposure control, adjustment of the 

mA and/or kV according to patient size, and/or use of iterative 

reconstruction technique.



FINDINGS:



LOWER THORAX:

Unremarkable. 



LIVER:

Unremarkable. No gross lesion or ductal dilatation.  



GALLBLADDER AND BILE DUCTS:

Unremarkable. 



PANCREAS:

Unremarkable. No gross lesion or ductal dilatation.



SPLEEN:

Unremarkable. 



ADRENALS:

Unremarkable. No mass. 



KIDNEYS AND URETERS:

Unremarkable. No hydronephrosis. No solid mass. 



VASCULATURE:

Unremarkable. No aortic aneurysm. 



BOWEL:

Unremarkable. No obstruction. No gross mural thickening. 



APPENDIX:

Unremarkable. Normal appendix. 



PERITONEUM:

Unremarkable. No free fluid. No free air. 



LYMPH NODES:

Unremarkable. No enlarged lymph nodes. 



BLADDER:

Unremarkable. 



REPRODUCTIVE:

Unremarkable. 



BONES:

No acute fracture. 



OTHER FINDINGS:

None.



IMPRESSION:

Unremarkable non contrast enhanced CT of the abdomen and pelvis.

## 2017-09-25 NOTE — US
PROCEDURE:   Right lower extremity venous duplex Doppler. 



HISTORY:

RLQ pain







COMPARISON:

None available.



TECHNIQUE:

Common femoral, superficial femoral, popliteal and posterior tibial 

veins were evaluated. Flow was assessed with color Doppler, 

compressibility, assessment of phasic flow and augmentation response. 



FINDINGS:



COMMON FEMORAL VEIN:

Unremarkable.



SUPERFICIAL FEMORAL VEIN:

Unremarkable.



POPLITEAL VEIN:

Unremarkable.



POSTERIOR TIBIAL VEIN:

Unremarkable.



OTHER FINDINGS:

None.



IMPRESSION:

No evidence of deep venous thrombosis in the right lower extremity.

## 2017-09-25 NOTE — CARD
--------------- APPROVED REPORT --------------





EKG Measurement

Heart [id number]

[place] [id number]

[id number]

[id number]

[id number]



<Conclusion>

Normal sinus rhythm

Normal ECG

## 2017-09-25 NOTE — ED PDOC
HPI: Abdomen


Time Seen by Provider: 17 07:08


Chief Complaint (Nursing): Abdominal Pain


Chief Complaint (Provider): Abdominal Pain


History Per: Patient


History/Exam Limitations: no limitations


Onset/Duration Of Symptoms: Days (x1)


Current Symptoms Are (Timing): Still Present


Additional Complaint(s): 





Kasia Salazar is a 42 year old female with a past medical history of asthma and 

a past surgical history of an appendectomy and a myomectomy presenting to the 

ED for an evaluation of cramping in her right lower leg occurring last night 

for which she took 3 tablets of Motrin. The patient states she then woke up 

this morning with left flank pain, nausea, and non-bloody vomiting. She also 

has associated decreased urination occurring since yesterday. She denies fever, 

constipation, diarrhea, dysuria, and hematuria. 





PMD: Betsy Hernandez MD








Past Medical History


Reviewed: Historical Data, Nursing Documentation, Vital Signs


Vital Signs: 


 Last Vital Signs











Temp  98.3 F   17 07:07


 


Pulse  85   17 07:07


 


Resp  18   17 07:07


 


BP  120/76   17 07:07


 


Pulse Ox  98   17 07:35














- Medical History


PMH: Asthma, Fractures, Hypercholesterolemia, Malignancy (sarcoma rt femur), 

Migraine


   Denies: Chronic Kidney Disease





- Surgical History


Surgical History: Appendectomy, 


Other surgeries: myomectomy





- Family History


Family History: States: CAD





- Social History


Current smoker - smoking cessation education provided: No


Ex-Smoker (has not smoked in the last 12 months): No


Alcohol: None


Drugs: Denies, Other (denies OCP use)





- Immunization History


Hx Tetanus Toxoid Vaccination: Yes


Hx Influenza Vaccination: Yes


Hx Pneumococcal Vaccination: No





- Home Medications


Home Medications: 


 Ambulatory Orders











 Medication  Instructions  Recorded


 


Ibuprofen [Motrin] 600 mg PO TID 7 Days  tab 17


 


Naproxen [Naprosyn] 500 mg PO BID PRN #15 tablet 17


 


Polyethylene Glycol 3350 [Miralax] 1 tbs PO DAILY PRN #1 bottle 17














- Allergies


Allergies/Adverse Reactions: 


 Allergies











Allergy/AdvReac Type Severity Reaction Status Date / Time


 


FISH Allergy Intermediate RASH Verified 17 07:07


 


sulfamethoxazole Allergy  RASH Verified 17 07:07





[From Bactrim]     


 


trimethoprim [From Bactrim] Allergy  RASH Verified 17 07:07


 


vancomycin Allergy  RASH Verified 17 07:07














Review of Systems


ROS Statement: Except As Marked, All Systems Reviewed And Found Negative


Constitutional: Negative for: Fever


Gastrointestinal: Positive for: Nausea, Vomiting (non-bloody), Abdominal Pain (

left flank pain).  Negative for: Diarrhea, Constipation


Genitourinary Female: Positive for: Other (decreased urination).  Negative for: 

Dysuria, Hematuria


Musculoskeletal: Positive for: Leg Pain (right lower leg cramping)





Physical Exam





- Reviewed


Nursing Documentation Reviewed: Yes


Vital Signs Reviewed: Yes





- Physical Exam


Appears: Positive for: Non-toxic.  Negative for: No Acute Distress (mild 

painful distress)


Head Exam: Positive for: ATRAUMATIC, NORMOCEPHALIC


Skin: Positive for: Normal Color, Warm, Dry


Eye Exam: Positive for: Normal appearance


ENT: Positive for: Normal ENT Inspection


Neck: Positive for: Normal


Cardiovascular/Chest: Positive for: Regular Rate, Rhythm


Respiratory: Negative for: Respiratory Distress


Gastrointestinal/Abdominal: Positive for: Tenderness (to LLQ).  Negative for: 

Guarding, Rebound


Back: Positive for: L CVA Tenderness.  Negative for: R CVA Tenderness


Extremity: Positive for: Tenderness (right calf tenderness)


Neurologic/Psych: Positive for: Alert, Oriented.  Negative for: Motor/Sensory 

Deficits





- Laboratory Results


Result Diagrams: 


 17 07:45





 17 07:45





- ECG


O2 Sat by Pulse Oximetry: 98 (RA)


Pulse Ox Interpretation: Normal





Medical Decision Making


Medical Decision Making: 





Time: 07:08


Impression: Right lower leg cramping


Plan:


* ED Ekg


* CMP


* CBC (with differential)


* PTT


* Prothrombin Time


* Urinalysis


* US Duplex Lower Ext Vein Right


* CT Abd & Pelvis W/O PO or IV Cont


* Morphine 2 mg IV


* NS 1,000 ml IV 1,000 mls/hr


* Zofran Inj 4 mg IV


* Reevaluation





Pt given copy of CT report.





--------------------------------------------------------------------------------

----------------- 


Scribe Attestation:


Documented by Dee Lozano, acting as a scribe for Delia Wade MD.


 


Provider Scribe Attestation:


All medical record entries made by the Scribe were at my direction and 

personally dictated by me. I have reviewed the chart and agree that the record 

accurately reflects my personal performance of the history, physical exam, 

medical decision making, and the department course for this patient. I have 

also personally directed, reviewed, and agree with the discharge instructions 

and disposition.








Disposition





- Clinical Impression


Clinical Impression: 


 Mesenteric adenitis, Leg pain, Hiatal hernia, Constipation








- Disposition


Referrals: 


Betsy Hernandez MD [Primary Care Provider] - 


Condition: STABLE


Prescriptions: 


Naproxen [Naprosyn] 500 mg PO BID PRN #15 tablet


 PRN Reason: Pain, Moderate (4-7)


Polyethylene Glycol 3350 [Miralax] 1 tbs PO DAILY PRN #1 bottle


 PRN Reason: Constipation


Instructions:  Mesenteric Adenitis (ED), Leg Pain (ED), Hiatal Hernia (ED), 

Constipation (ED)


Forms:  [facility] (English)

## 2018-01-22 ENCOUNTER — HOSPITAL ENCOUNTER (EMERGENCY)
Dept: HOSPITAL 14 - H.ER | Age: 43
Discharge: HOME | End: 2018-01-22
Payer: COMMERCIAL

## 2018-01-22 VITALS
OXYGEN SATURATION: 99 % | RESPIRATION RATE: 16 BRPM | SYSTOLIC BLOOD PRESSURE: 109 MMHG | DIASTOLIC BLOOD PRESSURE: 65 MMHG | HEART RATE: 88 BPM

## 2018-01-22 VITALS — BODY MASS INDEX: 25.7 KG/M2

## 2018-01-22 VITALS — TEMPERATURE: 99.9 F

## 2018-01-22 DIAGNOSIS — E78.00: ICD-10-CM

## 2018-01-22 DIAGNOSIS — Z82.49: ICD-10-CM

## 2018-01-22 DIAGNOSIS — J11.1: ICD-10-CM

## 2018-01-22 DIAGNOSIS — J45.909: ICD-10-CM

## 2018-01-22 DIAGNOSIS — N39.0: Primary | ICD-10-CM

## 2018-01-22 LAB
ALBUMIN SERPL-MCNC: 4.3 G/DL (ref 3.5–5)
ALBUMIN/GLOB SERPL: 1.2 {RATIO} (ref 1–2.1)
ALT SERPL-CCNC: 25 U/L (ref 9–52)
AST SERPL-CCNC: 19 U/L (ref 14–36)
BACTERIA #/AREA URNS HPF: (no result) /[HPF]
BASOPHILS # BLD AUTO: 0 K/UL (ref 0–0.2)
BASOPHILS NFR BLD: 0.5 % (ref 0–2)
BILIRUB UR-MCNC: NEGATIVE MG/DL
BUN SERPL-MCNC: 12 MG/DL (ref 7–17)
CALCIUM SERPL-MCNC: 9.2 MG/DL (ref 8.4–10.2)
COLOR UR: YELLOW
EOSINOPHIL # BLD AUTO: 0.1 K/UL (ref 0–0.7)
EOSINOPHIL NFR BLD: 0.8 % (ref 0–4)
ERYTHROCYTE [DISTWIDTH] IN BLOOD BY AUTOMATED COUNT: 13.4 % (ref 11.5–14.5)
GFR NON-AFRICAN AMERICAN: > 60
GLUCOSE UR STRIP-MCNC: (no result) MG/DL
HGB BLD-MCNC: 12.6 G/DL (ref 12–16)
LEUKOCYTE ESTERASE UR-ACNC: (no result) LEU/UL
LYMPHOCYTES # BLD AUTO: 1.1 K/UL (ref 1–4.3)
LYMPHOCYTES NFR BLD AUTO: 14.9 % (ref 20–40)
MCH RBC QN AUTO: 29.1 PG (ref 27–31)
MCHC RBC AUTO-ENTMCNC: 33.1 G/DL (ref 33–37)
MCV RBC AUTO: 87.7 FL (ref 81–99)
MONOCYTES # BLD: 0.6 K/UL (ref 0–0.8)
MONOCYTES NFR BLD: 7.8 % (ref 0–10)
NEUTROPHILS # BLD: 5.7 K/UL (ref 1.8–7)
NEUTROPHILS NFR BLD AUTO: 76 % (ref 50–75)
NRBC BLD AUTO-RTO: 0.1 % (ref 0–0)
PH UR STRIP: 5 [PH] (ref 5–8)
PLATELET # BLD: 162 K/UL (ref 130–400)
PMV BLD AUTO: 8.9 FL (ref 7.2–11.7)
PROT UR STRIP-MCNC: NEGATIVE MG/DL
RBC # BLD AUTO: 4.32 MIL/UL (ref 3.8–5.2)
RBC # UR STRIP: (no result) /UL
SP GR UR STRIP: 1.02 (ref 1–1.03)
SQUAMOUS EPITHIAL: 17 /HPF (ref 0–5)
URINE CLARITY: (no result)
URINE NITRATE: NEGATIVE
UROBILINOGEN UR-MCNC: (no result) MG/DL (ref 0.2–1)
WBC # BLD AUTO: 7.5 K/UL (ref 4.8–10.8)

## 2018-01-22 PROCEDURE — 87070 CULTURE OTHR SPECIMN AEROBIC: CPT

## 2018-01-22 PROCEDURE — 71046 X-RAY EXAM CHEST 2 VIEWS: CPT

## 2018-01-22 PROCEDURE — 99284 EMERGENCY DEPT VISIT MOD MDM: CPT

## 2018-01-22 PROCEDURE — 96361 HYDRATE IV INFUSION ADD-ON: CPT

## 2018-01-22 PROCEDURE — 96374 THER/PROPH/DIAG INJ IV PUSH: CPT

## 2018-01-22 PROCEDURE — 96375 TX/PRO/DX INJ NEW DRUG ADDON: CPT

## 2018-01-22 PROCEDURE — 87804 INFLUENZA ASSAY W/OPTIC: CPT

## 2018-01-22 PROCEDURE — 80053 COMPREHEN METABOLIC PANEL: CPT

## 2018-01-22 PROCEDURE — 85025 COMPLETE CBC W/AUTO DIFF WBC: CPT

## 2018-01-22 PROCEDURE — 87430 STREP A AG IA: CPT

## 2018-01-22 PROCEDURE — 81003 URINALYSIS AUTO W/O SCOPE: CPT

## 2018-01-22 PROCEDURE — 81025 URINE PREGNANCY TEST: CPT

## 2018-01-22 NOTE — RAD
HISTORY:

Cough  



COMPARISON:

Chest radiographs 07/15/2016



TECHNIQUE:

Chest PA and lateral



FINDINGS:



LUNGS:

No active pulmonary disease.



PLEURA:

No significant pleural effusion identified. No pneumothorax apparent.



CARDIOVASCULAR:

Normal.



OSSEOUS STRUCTURES:

No significant abnormalities.



VISUALIZED UPPER ABDOMEN:

Normal.



OTHER FINDINGS:

None.



IMPRESSION:

No interval acute cardiopulmonary disease appreciated.

## 2018-04-06 ENCOUNTER — HOSPITAL ENCOUNTER (EMERGENCY)
Dept: HOSPITAL 14 - H.ER | Age: 43
Discharge: HOME | End: 2018-04-06
Payer: COMMERCIAL

## 2018-04-06 VITALS
TEMPERATURE: 98.2 F | OXYGEN SATURATION: 100 % | RESPIRATION RATE: 16 BRPM | HEART RATE: 93 BPM | DIASTOLIC BLOOD PRESSURE: 66 MMHG | SYSTOLIC BLOOD PRESSURE: 108 MMHG

## 2018-04-06 VITALS — BODY MASS INDEX: 25.7 KG/M2

## 2018-04-06 DIAGNOSIS — K52.9: Primary | ICD-10-CM

## 2018-04-06 DIAGNOSIS — E78.00: ICD-10-CM

## 2018-04-06 DIAGNOSIS — K21.9: ICD-10-CM

## 2018-04-06 LAB
ALBUMIN SERPL-MCNC: 4.3 G/DL (ref 3.5–5)
ALBUMIN/GLOB SERPL: 1.1 {RATIO} (ref 1–2.1)
ALT SERPL-CCNC: 36 U/L (ref 9–52)
ANISOCYTOSIS BLD QL SMEAR: SLIGHT
AST SERPL-CCNC: 24 U/L (ref 14–36)
BASOPHILS # BLD AUTO: 0 K/UL (ref 0–0.2)
BASOPHILS NFR BLD: 0.3 % (ref 0–2)
BUN SERPL-MCNC: 17 MG/DL (ref 7–17)
CALCIUM SERPL-MCNC: 9.4 MG/DL (ref 8.4–10.2)
EOSINOPHIL # BLD AUTO: 0 K/UL (ref 0–0.7)
EOSINOPHIL NFR BLD: 0 % (ref 0–4)
ERYTHROCYTE [DISTWIDTH] IN BLOOD BY AUTOMATED COUNT: 13.9 % (ref 11.5–14.5)
GFR NON-AFRICAN AMERICAN: > 60
HGB BLD-MCNC: 12.8 G/DL (ref 12–16)
HYPOCHROMIC: SLIGHT
LYMPHOCYTE: 12 % (ref 20–50)
LYMPHOCYTES # BLD AUTO: 0.6 K/UL (ref 1–4.3)
LYMPHOCYTES NFR BLD AUTO: 7.9 % (ref 20–40)
MCH RBC QN AUTO: 29.5 PG (ref 27–31)
MCHC RBC AUTO-ENTMCNC: 34.2 G/DL (ref 33–37)
MCV RBC AUTO: 86.4 FL (ref 81–99)
MICROCYTES BLD QL SMEAR: SLIGHT
MONOCYTE: 7 % (ref 0–10)
MONOCYTES # BLD: 0.4 K/UL (ref 0–0.8)
MONOCYTES NFR BLD: 5.4 % (ref 0–10)
NEUTROPHILS # BLD: 6.8 K/UL (ref 1.8–7)
NEUTROPHILS NFR BLD AUTO: 78 % (ref 42–75)
NEUTROPHILS NFR BLD AUTO: 86.4 % (ref 50–75)
NEUTS BAND NFR BLD: 3 % (ref 0–2)
NRBC BLD AUTO-RTO: 0.1 % (ref 0–0)
OVALOCYTES BLD QL SMEAR: SLIGHT
PLATELET # BLD EST: NORMAL 10*3/UL
PLATELET # BLD: 188 K/UL (ref 130–400)
PMV BLD AUTO: 9.1 FL (ref 7.2–11.7)
RBC # BLD AUTO: 4.33 MIL/UL (ref 3.8–5.2)
TOTAL CELLS COUNTED BLD: 100
WBC # BLD AUTO: 7.9 K/UL (ref 4.8–10.8)

## 2018-04-06 PROCEDURE — 96375 TX/PRO/DX INJ NEW DRUG ADDON: CPT

## 2018-04-06 PROCEDURE — 85025 COMPLETE CBC W/AUTO DIFF WBC: CPT

## 2018-04-06 PROCEDURE — 96374 THER/PROPH/DIAG INJ IV PUSH: CPT

## 2018-04-06 PROCEDURE — 80053 COMPREHEN METABOLIC PANEL: CPT

## 2018-04-06 PROCEDURE — 81025 URINE PREGNANCY TEST: CPT

## 2018-04-06 PROCEDURE — 96372 THER/PROPH/DIAG INJ SC/IM: CPT

## 2018-04-06 PROCEDURE — 99283 EMERGENCY DEPT VISIT LOW MDM: CPT

## 2018-04-06 NOTE — ED PDOC
HPI: Abdomen


Time Seen by Provider: 18 15:06


Chief Complaint (Nursing): Abdominal Pain


Chief Complaint (Provider): Abdominal Pain


History Per: Patient


History/Exam Limitations: no limitations


Onset/Duration Of Symptoms: Days (x2)


Current Symptoms Are (Timing): Still Present


Additional Complaint(s): 


42 year old female with medical history of GERD and ovarian cysts, presents to 

the emergency department with a complaint of diffuse abdominal pain associated 

with 3 episodes of non-bloody diarrhea and 18 episodes of non-bloody, non-

bilious vomiting since eating [place] around 1830 last night. Patient 

stated symptoms began around 2100, in which, she took Pepto Bismal and 

Ibruprofen without relief. She denies any fever, chills, vaginal bleeding, 

urinary complaints, recent travel or sick contacts. Patient had called her PMD 

earlier today who advised her to go to ED for evaluation. 





PMD: Betsy Hernandez MD


LMP: 3/25/18





Past Medical History


Reviewed: Historical Data, Nursing Documentation, Vital Signs


Vital Signs: 


 Last Vital Signs











Temp  98.2 F   18 13:22


 


Pulse  93 H  18 13:22


 


Resp  16   18 13:22


 


BP  108/66   18 13:22


 


Pulse Ox  100   18 21:07














- Medical History


PMH: Asthma, Fractures (right foot), GERD, Hypercholesterolemia, Malignancy (

sarcoma rt femur), Migraine


   Denies: Chronic Kidney Disease


Other PMH: ovarian cyst





- Surgical History


Surgical History: Appendectomy, 


Other surgeries: tubal ligation; multiple leg and ankle procedures





- Family History


Family History: States: CAD





- Social History


Current smoker - smoking cessation education provided: No


Ex-Smoker (has not smoked in the last 12 months): No


Alcohol: None


Drugs: Denies





- Home Medications


Home Medications: 


 Ambulatory Orders











 Medication  Instructions  Recorded


 


Ibuprofen [Motrin] 600 mg PO TID 7 Days  tab 17


 


Naproxen [Naprosyn] 500 mg PO BID PRN #15 tablet 17


 


Polyethylene Glycol 3350 [Miralax] 1 tbs PO DAILY PRN #1 bottle 17


 


Naproxen [Naprosyn] 500 mg PO BID PRN #15 tablet 18


 


Nitrofurantoin Macrocrystals 100 mg PO BID #13 cap 18





[Macrobid]  


 


Dicyclomine [Bentyl] 20 mg PO TID PRN #12 tab 18


 


Ondansetron ODT [Zofran ODT] 4 mg PO Q6 PRN #12 odt 18














- Allergies


Allergies/Adverse Reactions: 


 Allergies











Allergy/AdvReac Type Severity Reaction Status Date / Time


 


FISH Allergy Intermediate RASH Verified 17 07:07


 


sulfamethoxazole Allergy  RASH Verified 17 07:07





[From Bactrim]     


 


trimethoprim [From Bactrim] Allergy  RASH Verified 17 07:07


 


vancomycin Allergy  RASH Verified 17 07:07














Review of Systems


ROS Statement: Except As Marked, All Systems Reviewed And Found Negative


Constitutional: Negative for: Fever, Chills


Gastrointestinal: Positive for: Vomiting (NBNB x18), Abdominal Pain (diffuse), 

Diarrhea (nonbloody x3)


Genitourinary Female: Negative for: Dysuria, Hematuria, Vaginal Bleeding





Physical Exam





- Reviewed


Nursing Documentation Reviewed: Yes


Vital Signs Reviewed: Yes





- Physical Exam


Appears: Positive for: Well, Non-toxic, Uncomfortable


Head Exam: Positive for: ATRAUMATIC, NORMOCEPHALIC


Skin: Positive for: Normal Color, Warm, Dry


Eye Exam: Positive for: EOMI, PERRL


Neck: Positive for: Painless ROM, Supple


Cardiovascular/Chest: Positive for: Regular Rate, Rhythm.  Negative for: Chest 

Non Tender


Respiratory: Positive for: Normal Breath Sounds.  Negative for: Decreased 

Breath Sounds, Accessory Muscle Use, Respiratory Distress


Gastrointestinal/Abdominal: Positive for: Bowel Sounds (active x4), Soft, 

Tenderness (diffuse).  Negative for: Mass, Distended, Guarding, Rebound


Back: Positive for: Normal Inspection.  Negative for: L CVA Tenderness, R CVA 

Tenderness


Extremity: Positive for: Normal ROM.  Negative for: Deformity


Neurologic/Psych: Positive for: Alert, Oriented (x3), Gait (steady in ED).  

Negative for: Motor/Sensory Deficits, Aphasia, Facial Droop





- Laboratory Results


Result Diagrams: 


 18 15:43





 18 15:43


Urine Pregnancy POC: Negative


Urine dip results: Positive for: Blood (moderate), Protein (30).  Negative for: 

Leukocyte Esterase, Nitrate, Ketones, Glucose, Bilirubin





- ECG


O2 Sat by Pulse Oximetry: 100 (RA)


Pulse Ox Interpretation: Normal





Medical Decision Making


Medical Decision Making: 


Initial Impression: Gastroenteritis, vomiting and diarrhea


Initial Plan:


 CMP


* Urine pregnancy


* Urine dipstick


* CBC


* Bentyl 20mg IM


* NS 1,000ml IV 


* Pepcid 40mg IVP


* Toradol 30mg IVP


* Zofran 8mg PO





1900


Patient resting comfortably in ED stretcher. Pending urine results.








Labs and urine reviewed, grossly unremarkable. Patient tolerating PO intake in 

ED without difficulty. No further vomiting or diarrhea episodes in ED. 


On re-evaluation, patient reports improvement of symptoms, denies any abdominal 

pain at this time. On exam, patient remains AAOx3, in no acute distress. Lungs 

clear to auscultation, cardiac RRR, abdomen soft, non-tender, repeat neuro exam 

shows no focal findings.  VSS.


Diagnostic results d/w the patient in great detail. Diagnosis of nausea and 

vomiting, diarrhea, gastroenteritis d/w the patient. 


Based on history, exam and diagnostic results, plan will be for outpatient 

follow up. 


Patient instructed to follow-up with pmd / referral provided / the clinic  in 1-

2 days without fail. Advised to take medication as prescribed. Return to the 

emergency room at any time for any new or worsening symptoms. Patient states 

she fully agrees with and understands discharge instructions. States that she 

agrees with the plan and disposition. Verbalized and repeated discharge 

instructions and plan. I have given the patient opportunity to ask any 

additional questions.





--------------------------------------------------------------------------------

----------------------------------------


Scribe Attestation:


Documented by Madeleine Cheng, acting as a scribe for Tennille Mcnally MD.





Provider Scribe Attestation:


All medical record entries made by the Scribe were at my direction and 

personally dictated by me. I have reviewed the chart and agree that the record 

accurately reflects my personal performance of the history, physical exam, 

medical decision making, and the department course for this patient. I have 

also personally directed, reviewed, and agree with the discharge instructions 

and disposition.





Disposition





- Clinical Impression


Clinical Impression: 


 Nausea and vomiting, Diarrhea, Gastroenteritis, Abdominal pain in female








- Patient ED Disposition


Is Patient to be Admitted: No


Counseled Patient/Family Regarding: Studies Performed, Diagnosis, Need For 

Followup, Rx Given





- Disposition


Referrals: 


Betsy Hernandez MD [Family Provider] - 


Disposition: Routine/Home


Disposition Time: 19:41


Condition: STABLE


Prescriptions: 


Dicyclomine [Bentyl] 20 mg PO TID PRN #12 tab


 PRN Reason: Diarrhea


Ondansetron ODT [Zofran ODT] 4 mg PO Q6 PRN #12 odt


 PRN Reason: Nausea/Vomiting


Instructions:  Diarrhea in Adolescents and Adults, [place] Diet, Acute Abdomen (

Belly Pain), Nausea and Vomiting, Adult, Stomach Ache and Stomach Upset


Forms:  [facility] (English)


Print Language: ENGLISH





- POA


Present On Arrival: None





Results





- Lab Results


Lab Results: 

















  18





  15:43 15:43


 


WBC   7.9


 


RBC   4.33


 


Hgb   12.8


 


Hct   37.4


 


MCV   86.4


 


MCH   29.5


 


MCHC   34.2


 


RDW   13.9


 


Plt Count   188


 


MPV   9.1


 


Neut % (Auto)   86.4 H


 


Lymph % (Auto)   7.9 L


 


Mono % (Auto)   5.4


 


Eos % (Auto)   0.0


 


Baso % (Auto)   0.3


 


Neut # (Auto)   6.8


 


Lymph # (Auto)   0.6 L


 


Mono # (Auto)   0.4


 


Eos # (Auto)   0.0


 


Baso # (Auto)   0.0


 


Neutrophils % (Manual)   78 H


 


Band Neutrophils %   3 H


 


Lymphocytes % (Manual)   12 L


 


Monocytes % (Manual)   7


 


Platelet Estimate   Normal


 


Hypochromasia (manual)   Slight


 


Anisocytosis (manual)   Slight


 


Microcytosis (manual)   Slight


 


Ovalocytes   Slight


 


Sodium  138 


 


Potassium  3.6 


 


Chloride  100 


 


Carbon Dioxide  25 


 


Anion Gap  17 


 


BUN  17 


 


Creatinine  0.6 L 


 


Est GFR ( Amer)  > 60 


 


Est GFR (Non-Af Amer)  > 60 


 


Random Glucose  102 


 


Calcium  9.4 


 


Total Bilirubin  1.0 


 


AST  24 


 


ALT  36 


 


Alkaline Phosphatase  47 


 


Total Protein  8.2 


 


Albumin  4.3 


 


Globulin  3.9 


 


Albumin/Globulin Ratio  1.1

## 2018-04-29 ENCOUNTER — HOSPITAL ENCOUNTER (EMERGENCY)
Dept: HOSPITAL 14 - H.ER | Age: 43
Discharge: HOME | End: 2018-04-29
Payer: COMMERCIAL

## 2018-04-29 VITALS — TEMPERATURE: 98 F | SYSTOLIC BLOOD PRESSURE: 113 MMHG | HEART RATE: 88 BPM | DIASTOLIC BLOOD PRESSURE: 72 MMHG

## 2018-04-29 VITALS — RESPIRATION RATE: 18 BRPM | OXYGEN SATURATION: 99 %

## 2018-04-29 VITALS — BODY MASS INDEX: 25.7 KG/M2

## 2018-04-29 DIAGNOSIS — E78.00: ICD-10-CM

## 2018-04-29 DIAGNOSIS — L55.0: Primary | ICD-10-CM

## 2018-04-29 LAB
BASOPHILS # BLD AUTO: 0 K/UL (ref 0–0.2)
BASOPHILS NFR BLD: 0.3 % (ref 0–2)
BUN SERPL-MCNC: 12 MG/DL (ref 7–17)
CALCIUM SERPL-MCNC: 8.9 MG/DL (ref 8.4–10.2)
EOSINOPHIL # BLD AUTO: 0.1 K/UL (ref 0–0.7)
EOSINOPHIL NFR BLD: 0.7 % (ref 0–4)
ERYTHROCYTE [DISTWIDTH] IN BLOOD BY AUTOMATED COUNT: 13.5 % (ref 11.5–14.5)
GFR NON-AFRICAN AMERICAN: > 60
HGB BLD-MCNC: 12 G/DL (ref 12–16)
LYMPHOCYTES # BLD AUTO: 1.7 K/UL (ref 1–4.3)
LYMPHOCYTES NFR BLD AUTO: 18.6 % (ref 20–40)
MCH RBC QN AUTO: 29 PG (ref 27–31)
MCHC RBC AUTO-ENTMCNC: 33.5 G/DL (ref 33–37)
MCV RBC AUTO: 86.5 FL (ref 81–99)
MONOCYTES # BLD: 0.8 K/UL (ref 0–0.8)
MONOCYTES NFR BLD: 8.9 % (ref 0–10)
NEUTROPHILS # BLD: 6.6 K/UL (ref 1.8–7)
NEUTROPHILS NFR BLD AUTO: 71.5 % (ref 50–75)
NRBC BLD AUTO-RTO: 0 % (ref 0–0)
PLATELET # BLD: 194 K/UL (ref 130–400)
PMV BLD AUTO: 9.5 FL (ref 7.2–11.7)
RBC # BLD AUTO: 4.14 MIL/UL (ref 3.8–5.2)
WBC # BLD AUTO: 9.3 K/UL (ref 4.8–10.8)

## 2018-04-29 PROCEDURE — 96375 TX/PRO/DX INJ NEW DRUG ADDON: CPT

## 2018-04-29 PROCEDURE — 87040 BLOOD CULTURE FOR BACTERIA: CPT

## 2018-04-29 PROCEDURE — 80048 BASIC METABOLIC PNL TOTAL CA: CPT

## 2018-04-29 PROCEDURE — 96374 THER/PROPH/DIAG INJ IV PUSH: CPT

## 2018-04-29 PROCEDURE — 83605 ASSAY OF LACTIC ACID: CPT

## 2018-04-29 PROCEDURE — 85025 COMPLETE CBC W/AUTO DIFF WBC: CPT

## 2018-04-29 PROCEDURE — 99284 EMERGENCY DEPT VISIT MOD MDM: CPT
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
## 2018-10-17 ENCOUNTER — HOSPITAL ENCOUNTER (EMERGENCY)
Dept: HOSPITAL 14 - H.ER | Age: 43
Discharge: HOME | End: 2018-10-17
Payer: MEDICAID

## 2018-10-17 VITALS
HEART RATE: 75 BPM | TEMPERATURE: 98.3 F | DIASTOLIC BLOOD PRESSURE: 57 MMHG | SYSTOLIC BLOOD PRESSURE: 100 MMHG | OXYGEN SATURATION: 99 %

## 2018-10-17 VITALS — BODY MASS INDEX: 25.7 KG/M2

## 2018-10-17 VITALS — RESPIRATION RATE: 18 BRPM

## 2018-10-17 DIAGNOSIS — E78.00: ICD-10-CM

## 2018-10-17 DIAGNOSIS — D25.9: Primary | ICD-10-CM

## 2018-10-17 LAB
ALBUMIN SERPL-MCNC: 4 G/DL (ref 3.5–5)
ALBUMIN/GLOB SERPL: 1.1 {RATIO} (ref 1–2.1)
ALT SERPL-CCNC: 35 U/L (ref 9–52)
AST SERPL-CCNC: 22 U/L (ref 14–36)
BASOPHILS # BLD AUTO: 0 K/UL (ref 0–0.2)
BASOPHILS NFR BLD: 0.4 % (ref 0–2)
BUN SERPL-MCNC: 13 MG/DL (ref 7–17)
CALCIUM SERPL-MCNC: 9.2 MG/DL (ref 8.4–10.2)
EOSINOPHIL # BLD AUTO: 0.1 K/UL (ref 0–0.7)
EOSINOPHIL NFR BLD: 1 % (ref 0–4)
ERYTHROCYTE [DISTWIDTH] IN BLOOD BY AUTOMATED COUNT: 13.4 % (ref 11.5–14.5)
GFR NON-AFRICAN AMERICAN: > 60
HGB BLD-MCNC: 12 G/DL (ref 12–16)
LIPASE SERPL-CCNC: 143 U/L (ref 23–300)
LYMPHOCYTES # BLD AUTO: 1.4 K/UL (ref 1–4.3)
LYMPHOCYTES NFR BLD AUTO: 26.6 % (ref 20–40)
MCH RBC QN AUTO: 29.9 PG (ref 27–31)
MCHC RBC AUTO-ENTMCNC: 34 G/DL (ref 33–37)
MCV RBC AUTO: 87.7 FL (ref 81–99)
MONOCYTES # BLD: 0.5 K/UL (ref 0–0.8)
MONOCYTES NFR BLD: 10.6 % (ref 0–10)
NEUTROPHILS # BLD: 3.1 K/UL (ref 1.8–7)
NEUTROPHILS NFR BLD AUTO: 61.4 % (ref 50–75)
NRBC BLD AUTO-RTO: 0.1 % (ref 0–0)
PLATELET # BLD: 171 K/UL (ref 130–400)
PMV BLD AUTO: 8.6 FL (ref 7.2–11.7)
RBC # BLD AUTO: 4.02 MIL/UL (ref 3.8–5.2)
WBC # BLD AUTO: 5.1 K/UL (ref 4.8–10.8)

## 2018-10-17 PROCEDURE — 99284 EMERGENCY DEPT VISIT MOD MDM: CPT

## 2018-10-17 PROCEDURE — 76830 TRANSVAGINAL US NON-OB: CPT

## 2018-10-17 PROCEDURE — 85025 COMPLETE CBC W/AUTO DIFF WBC: CPT

## 2018-10-17 PROCEDURE — 81025 URINE PREGNANCY TEST: CPT

## 2018-10-17 PROCEDURE — 83690 ASSAY OF LIPASE: CPT

## 2018-10-17 PROCEDURE — 96374 THER/PROPH/DIAG INJ IV PUSH: CPT

## 2018-10-17 PROCEDURE — 96375 TX/PRO/DX INJ NEW DRUG ADDON: CPT

## 2018-10-17 PROCEDURE — 80053 COMPREHEN METABOLIC PANEL: CPT

## 2018-10-17 PROCEDURE — 96361 HYDRATE IV INFUSION ADD-ON: CPT

## 2018-10-17 RX ADMIN — OXYCODONE HYDROCHLORIDE AND ACETAMINOPHEN ONE TAB: 5; 325 TABLET ORAL at 11:43

## 2018-10-17 NOTE — ED PDOC
HPI: Abdomen


Time Seen by Provider: 10/17/18 07:54


Chief Complaint (Nursing): Abdominal Pain


Chief Complaint (Provider): Abd pain


History Per: Patient


History/Exam Limitations: no limitations


Onset/Duration Of Symptoms: Days (Thurs)


Additional Complaint(s): 





Pt. with left lower abd pain going into the left flank.  Was off and on and now 

constant.  Is a sharp pain.  Nausea, vomit, nonbloody with it.  Had similar 3 

weeks ago and was told she had a fibroid.  Has spotting vaginal bleeding curren

tly.  No diarrhea, dysuria.  No weakness.  





Past Medical History


Reviewed: Nursing Documentation, Vital Signs


Vital Signs: 





                                Last Vital Signs











Temp  97.9 F   10/17/18 07:43


 


Pulse  81   10/17/18 07:43


 


Resp  18   10/17/18 07:43


 


BP  126/80   10/17/18 07:43


 


Pulse Ox  98   10/17/18 07:43














- Medical History


PMH: Asthma, Fractures (right foot), GERD, Hypercholesterolemia, Malignancy 

(sarcoma rt femur), Migraine


   Denies: Chronic Kidney Disease


Other PMH: fibroids





- Surgical History


Surgical History: Appendectomy, 





- Family History


Family History: States: CAD





- Immunization History


Hx Tetanus Toxoid Vaccination: Yes


Hx Influenza Vaccination: Yes


Hx Pneumococcal Vaccination: No





- Home Medications


Home Medications: 


                                Ambulatory Orders











 Medication  Instructions  Recorded


 


Ibuprofen [Motrin] 600 mg PO TID 7 Days  tab 17


 


Naproxen [Naprosyn] 500 mg PO BID PRN #15 tablet 17


 


Polyethylene Glycol 3350 [Miralax] 1 tbs PO DAILY PRN #1 bottle 17


 


Naproxen [Naprosyn] 500 mg PO BID PRN #15 tablet 18


 


Nitrofurantoin Macrocrystals 100 mg PO BID #13 cap 18





[Macrobid]  


 


Dicyclomine [Bentyl] 20 mg PO TID PRN #12 tab 18


 


Ondansetron ODT [Zofran ODT] 4 mg PO Q6 PRN #12 odt 18


 


Cephalexin [Keflex] 250 mg PO QID 7 Days  capsule 18


 


traMADol [Ultram] 50 mg PO Q8 #15 tab 18


 


Diazepam [Valium] 2 mg PO BID PRN #6 tab 10/17/18














- Allergies


Allergies/Adverse Reactions: 


                                    Allergies











Allergy/AdvReac Type Severity Reaction Status Date / Time


 


FISH Allergy Intermediate RASH Verified 10/17/18 07:57


 


sulfamethoxazole Allergy  RASH Verified 10/17/18 07:57





[From Bactrim]     


 


trimethoprim [From Bactrim] Allergy  RASH Verified 10/17/18 07:57


 


vancomycin Allergy  RASH Verified 10/17/18 07:57














Review of Systems


ROS Statement: Except As Marked, All Systems Reviewed And Found Negative


Gastrointestinal: Positive for: Nausea, Vomiting, Abdominal Pain


Musculoskeletal: Positive for: Back Pain





Physical Exam





- Reviewed


Nursing Documentation Reviewed: Yes


Vital Signs Reviewed: Yes





- Physical Exam


Appears: Positive for: Non-toxic, No Acute Distress


Head Exam: Positive for: ATRAUMATIC, NORMAL INSPECTION, NORMOCEPHALIC


Skin: Positive for: Normal Color, Warm, DRY


Eye Exam: Positive for: EOMI, Normal appearance, PERRL


ENT: Positive for: Normal ENT Inspection


Neck: Positive for: Normal, Painless ROM


Cardiovascular/Chest: Positive for: Regular Rate, Rhythm


Respiratory: Positive for: CNT, Normal Breath Sounds


Gastrointestinal/Abdominal: Positive for: Soft, Tenderness (LLQ)


Back: Positive for: L CVA Tenderness.  Negative for: R CVA Tenderness


Extremity: Positive for: Normal ROM.  Negative for: Tenderness, Pedal Edema


Neurologic/Psych: Positive for: Alert, Oriented





- Laboratory Results


Result Diagrams: 


                                 10/17/18 08:24





                                 10/17/18 08:24


Interpretation Of Abn Labs: no acute





- ECG


O2 Sat by Pulse Oximetry: 98





- CT Scan/US


  ** US


Other Rad Studies (CT/US): Read By Radiologist


Other Rad Interpretation: large fibroid





- Progress


ED Course And Treament: 





1134:  Pt. stable.  Demanding more pain meds.  No acute findings except fibroid.

 Tolerated PO.  Fu with pcp/obgyn.  AAOx3.





Disposition





- Clinical Impression


Clinical Impression: 


 Fibroid








- Patient ED Disposition


Is Patient to be Admitted: No


Counseled Patient/Family Regarding: Studies Performed, Diagnosis, Need For 

Followup, Rx Given





- Disposition


[facility]errals: 


[facility] - [facility]


Disposition: Routine/Home


Disposition Time: 11:36


Condition: STABLE


Additional Instructions: 


Return if not better in 3 days.


Prescriptions: 


Diazepam [Valium] 2 mg PO BID PRN #6 tab


 PRN Reason: Muscle Spasm


Instructions:  Uterine Fibroids


Forms:  [facility] (English), [facility] ED School/Work Excuse

## 2018-10-17 NOTE — US
Date of service: 



10/17/2018



HISTORY:

Vaginal bleeding 



COMPARISON:

None available.



TECHNIQUE:

Transvaginal pelvic ultrasound was performed.



FINDINGS:



UTERUS:

Measures 11.8 x 7.1 x 6.2 cm. Anteverted and enlarged.  There is a 

1.1 x 1.1 x 1.2 cm posterior wall mid body intramural fibroid, 0.8 x 

0.7 cm anterior wall midbody intramural fibroid and 1.3 x 0.6 x 1.3 

cm anterior fundal subserosal fibroid. 



ENDOMETRIUM:

Measures 4 mm in diameter. Unremarkable. 



CERVIX:

There is small amount of fluid in the endocervical canal.



RIGHT OVARY:

Measures 2.8 x 2.4 x 1.9 cm. No solid mass. Normal flow. 



LEFT OVARY:

Measures 2.4 x 2.4 x 1.7 cm. No solid mass. Normal flow. 



FREE FLUID:

No significant free fluid noted.



OTHER FINDINGS:

None. 



IMPRESSION:

Enlarged fibroid uterus, the largest 1.3 cm anterior wall subserosal 

fundal fibroid.

## 2018-11-14 ENCOUNTER — HOSPITAL ENCOUNTER (EMERGENCY)
Dept: HOSPITAL 14 - H.ER | Age: 43
Discharge: HOME | End: 2018-11-14
Payer: COMMERCIAL

## 2018-11-14 VITALS — TEMPERATURE: 98.6 F

## 2018-11-14 VITALS — SYSTOLIC BLOOD PRESSURE: 113 MMHG | HEART RATE: 83 BPM | RESPIRATION RATE: 14 BRPM | DIASTOLIC BLOOD PRESSURE: 68 MMHG

## 2018-11-14 VITALS — BODY MASS INDEX: 25.7 KG/M2

## 2018-11-14 VITALS — OXYGEN SATURATION: 98 %

## 2018-11-14 DIAGNOSIS — R07.89: Primary | ICD-10-CM

## 2018-11-14 DIAGNOSIS — R11.2: ICD-10-CM

## 2018-11-14 LAB
ALBUMIN SERPL-MCNC: 4.5 G/DL (ref 3.5–5)
ALBUMIN/GLOB SERPL: 1.3 {RATIO} (ref 1–2.1)
ALT SERPL-CCNC: 22 U/L (ref 9–52)
AST SERPL-CCNC: 18 U/L (ref 14–36)
BASOPHILS # BLD AUTO: 0 K/UL (ref 0–0.2)
BASOPHILS NFR BLD: 0.4 % (ref 0–2)
BUN SERPL-MCNC: 10 MG/DL (ref 7–17)
CALCIUM SERPL-MCNC: 9.7 MG/DL (ref 8.4–10.2)
EOSINOPHIL # BLD AUTO: 0 K/UL (ref 0–0.7)
EOSINOPHIL NFR BLD: 0.2 % (ref 0–4)
ERYTHROCYTE [DISTWIDTH] IN BLOOD BY AUTOMATED COUNT: 13.8 % (ref 11.5–14.5)
GFR NON-AFRICAN AMERICAN: > 60
HGB BLD-MCNC: 12.4 G/DL (ref 12–16)
LYMPHOCYTES # BLD AUTO: 1.1 K/UL (ref 1–4.3)
LYMPHOCYTES NFR BLD AUTO: 19.1 % (ref 20–40)
MCH RBC QN AUTO: 28.9 PG (ref 27–31)
MCHC RBC AUTO-ENTMCNC: 33 G/DL (ref 33–37)
MCV RBC AUTO: 87.4 FL (ref 81–99)
MONOCYTES # BLD: 0.4 K/UL (ref 0–0.8)
MONOCYTES NFR BLD: 6 % (ref 0–10)
NEUTROPHILS # BLD: 4.5 K/UL (ref 1.8–7)
NEUTROPHILS NFR BLD AUTO: 74.3 % (ref 50–75)
NRBC BLD AUTO-RTO: 0 % (ref 0–0)
PLATELET # BLD: 209 K/UL (ref 130–400)
PMV BLD AUTO: 9 FL (ref 7.2–11.7)
RBC # BLD AUTO: 4.28 MIL/UL (ref 3.8–5.2)
WBC # BLD AUTO: 6 K/UL (ref 4.8–10.8)

## 2018-11-14 PROCEDURE — 93005 ELECTROCARDIOGRAM TRACING: CPT

## 2018-11-14 PROCEDURE — 80053 COMPREHEN METABOLIC PANEL: CPT

## 2018-11-14 PROCEDURE — 84484 ASSAY OF TROPONIN QUANT: CPT

## 2018-11-14 PROCEDURE — 96360 HYDRATION IV INFUSION INIT: CPT

## 2018-11-14 PROCEDURE — 99285 EMERGENCY DEPT VISIT HI MDM: CPT

## 2018-11-14 PROCEDURE — 85025 COMPLETE CBC W/AUTO DIFF WBC: CPT

## 2018-11-14 PROCEDURE — 81025 URINE PREGNANCY TEST: CPT

## 2018-11-14 NOTE — CARD
--------------- APPROVED REPORT --------------





Date of service: 11/14/2018



EKG Measurement

Heart [id number]

UT [id number]

[id number]

[id number]

[id number]



<Conclusion>

Normal sinus rhythm with sinus arrhythmia

Left ventricular hypertrophy with repolarization abnormality

Abnormal ECG

## 2018-11-14 NOTE — ED PDOC
HPI: Chest Pain


Time Seen by Provider: 18 01:15


Chief Complaint (Nursing): Chest Pain


Chief Complaint (Provider): Palpitations and Chest Pain


History Per: Patient


History/Exam Limitations: no limitations


Onset/Duration Of Symptoms: Days (x1)


Current Symptoms Are (Timing): Still Present


Additional Complaint(s): 


Kasia Salazar, a 43 year old  female with a past medical history of 

gastritis, fibroids, and migraines, presents to the ED complaining of chest pain

and palpitations onset x1 day. Patient states she first went to [facility] ED for palpitations and headache. She reports being given IV Reglan 

and toradol, and then was discharged. After going home, she states she became 

nauseous and x8 episodes of nonbilious nonbloody vomiting. She also states she 

developed chest discomfort and agitation after IV medications. 





PCP: Betsy Hernandez








Past Medical History


Reviewed: Historical Data, Nursing Documentation, Vital Signs


Vital Signs: 





                                Last Vital Signs











Temp  98.6 F   18 00:51


 


Pulse  86   18 00:51


 


Resp  16   18 00:51


 


BP  136/90   18 00:51


 


Pulse Ox  98   18 00:51














- Medical History


PMH: Asthma, Fractures (right foot), Gastritis, GERD, Hypercholesterolemia, 

Malignancy (sarcoma rt femur), Migraine


   Denies: Chronic Kidney Disease


Other PMH: fibroids





- Surgical History


Surgical History: Appendectomy, 


Other surgeries: laparoscopy, right femur surgery, right foot surgery





- Family History


Family History: States: CAD





- Social History


Current smoker - smoking cessation education provided: No


Alcohol: None


Drugs: Denies





- Immunization History


Hx Tetanus Toxoid Vaccination: Yes


Hx Influenza Vaccination: Yes


Hx Pneumococcal Vaccination: No





- Home Medications


Home Medications: 


                                Ambulatory Orders











 Medication  Instructions  Recorded


 


Ibuprofen [Motrin] 600 mg PO TID 7 Days  tab 17


 


Naproxen [Naprosyn] 500 mg PO BID PRN #15 tablet 17


 


Polyethylene Glycol 3350 [Miralax] 1 tbs PO DAILY PRN #1 bottle 17


 


Naproxen [Naprosyn] 500 mg PO BID PRN #15 tablet 18


 


Nitrofurantoin Macrocrystals 100 mg PO BID #13 cap 18





[Macrobid]  


 


Dicyclomine [Bentyl] 20 mg PO TID PRN #12 tab 18


 


Ondansetron ODT [Zofran ODT] 4 mg PO Q6 PRN #12 odt 18


 


Cephalexin [Keflex] 250 mg PO QID 7 Days  capsule 18


 


RX: traMADol [Ultram] 50 mg PO Q8 #15 tab 18


 


Diazepam [Valium] 2 mg PO BID PRN #6 tab 10/17/18


 


Ondansetron ODT [Zofran ODT] 4 mg PO Q6H PRN #8 odt 18














- Allergies


Allergies/Adverse Reactions: 


                                    Allergies











Allergy/AdvReac Type Severity Reaction Status Date / Time


 


FISH Allergy Intermediate RASH Verified 10/17/18 07:57


 


sulfamethoxazole Allergy  RASH Verified 10/17/18 07:57





[From Bactrim]     


 


trimethoprim [From Bactrim] Allergy  RASH Verified 10/17/18 07:57


 


vancomycin Allergy  RASH Verified 10/17/18 07:57














Review of Systems


ROS Statement: Except As Marked, All Systems Reviewed And Found Negative


Cardiovascular: Positive for: Chest Pain, Palpitations


Gastrointestinal: Positive for: Nausea, Vomiting (x8 nonbilious nonbloody 

episodes)





Physical Exam





- Reviewed


Nursing Documentation Reviewed: Yes


Vital Signs Reviewed: Yes





- Physical Exam


Appears: Positive for: Uncomfortable


Skin: Positive for: Normal Color, Warm, DRY


Eye Exam: Positive for: EOMI, Normal appearance, PERRL


ENT: Positive for: Other (mucous membranes dry)


Cardiovascular/Chest: Positive for: Regular Rate, Rhythm.  Negative for: Murmur


Respiratory: Positive for: Normal Breath Sounds.  Negative for: Respiratory 

Distress


Extremity: Positive for: Normal ROM.  Negative for: Deformity


Neurologic/Psych: Positive for: Alert, Oriented.  Negative for: Motor/Sensory 

Deficits





- Laboratory Results


Result Diagrams: 


                                 18 01:51





                                 18 01:51





- ECG


O2 Sat by Pulse Oximetry: 98 (RA)


Pulse Ox Interpretation: Normal





Medical Decision Making


Medical Decision Making: 


Time: 128


Initial impression: 43 year old female with palpitations and chest pain


Initial Plan:


--labs


--EKG


--Urine pregnancy test


--Urine dipstick


--Pepcid 20 mg IV


--Zofran 4 mg IV








Labs reviewed show no clinically significant abnormalities


Patient feels improved and is stable for discharge home





Dx Atypical Chest Pain





 

--------------------------------------------------------------------------------


-----





Scribe Attestation:


Documented by Jovon Barney, acting as a scribe for Shahab Montanez MD.





Provider Scribe Attestation:


All medical record entries made by the Scribe were at my direction and 

personally dictated by me. I have reviewed the chart and agree that the record 

accurately reflects my personal performance of the history, physical exam, 

medical decision making, and the department course for this patient. I have also

personally directed, reviewed, and agree with the discharge instructions and di

sposition.


   








Disposition





- Clinical Impression


Clinical Impression: 


 Atypical chest pain, Nausea and vomiting








- Disposition


Disposition: Routine/Home


Disposition Time: 05:00


Condition: STABLE


Prescriptions: 


Ondansetron ODT [Zofran ODT] 4 mg PO Q6H PRN #8 odt


 PRN Reason: Nausea/Vomiting


Instructions:  Chest Pain That Is Not Caused by the Heart (DC)


Forms:  [facility] (English)

## 2019-04-16 ENCOUNTER — HOSPITAL ENCOUNTER (OUTPATIENT)
Dept: HOSPITAL 31 - C.RADH | Age: 44
End: 2019-04-16
Payer: COMMERCIAL

## 2019-04-16 DIAGNOSIS — M54.2: ICD-10-CM

## 2019-04-16 DIAGNOSIS — C49.21: Primary | ICD-10-CM

## 2019-04-19 ENCOUNTER — HOSPITAL ENCOUNTER (OUTPATIENT)
Dept: HOSPITAL 42 - RAD | Age: 44
End: 2019-04-19
Payer: COMMERCIAL

## 2019-04-19 DIAGNOSIS — C49.21: Primary | ICD-10-CM
